# Patient Record
Sex: MALE | Race: BLACK OR AFRICAN AMERICAN | URBAN - METROPOLITAN AREA
[De-identification: names, ages, dates, MRNs, and addresses within clinical notes are randomized per-mention and may not be internally consistent; named-entity substitution may affect disease eponyms.]

---

## 2017-01-16 ENCOUNTER — EMERGENCY (EMERGENCY)
Facility: HOSPITAL | Age: 77
LOS: 1 days | Discharge: HOME | End: 2017-01-16
Attending: EMERGENCY MEDICINE
Payer: MEDICARE

## 2017-01-16 VITALS
OXYGEN SATURATION: 98 % | HEIGHT: 73 IN | TEMPERATURE: 98 F | HEART RATE: 84 BPM | SYSTOLIC BLOOD PRESSURE: 171 MMHG | DIASTOLIC BLOOD PRESSURE: 82 MMHG | WEIGHT: 214.95 LBS | RESPIRATION RATE: 19 BRPM

## 2017-01-16 PROCEDURE — 70450 CT HEAD/BRAIN W/O DYE: CPT | Mod: 26

## 2017-01-16 PROCEDURE — 99284 EMERGENCY DEPT VISIT MOD MDM: CPT

## 2017-01-16 RX ORDER — ACETAMINOPHEN 500 MG
650 TABLET ORAL ONCE
Qty: 0 | Refills: 0 | Status: COMPLETED | OUTPATIENT
Start: 2017-01-16 | End: 2017-01-16

## 2017-01-16 RX ADMIN — Medication 650 MILLIGRAM(S): at 19:55

## 2017-01-16 NOTE — ED PROVIDER NOTE - NS ED MD SCRIBE ATTENDING SCRIBE SECTIONS
HISTORY OF PRESENT ILLNESS/VITAL SIGNS( Pullset)/DISPOSITION/PAST MEDICAL/SURGICAL/SOCIAL HISTORY/REVIEW OF SYSTEMS/PHYSICAL EXAM

## 2017-01-16 NOTE — ED PROVIDER NOTE - OBJECTIVE STATEMENT
A 76 year old male pt presents to the ED s/p fall. The pt is from [facility] and experienced an unwitnessed fall in his room. He denies any LOC and is currently c/o head and hip pain. No further complaints at this time.

## 2017-01-16 NOTE — ED PROVIDER NOTE - CONSTITUTIONAL, MLM
normal... Pt with slurred speech. Pt is unkempt. Awake, alert, oriented to person, place, time/situation.

## 2017-01-16 NOTE — ED ADULT TRIAGE NOTE - CHIEF COMPLAINT QUOTE
patient brought from [facility] home, s/p fall, unwitnessed.  Patient is alert and oriented x3, patient c/o pain in the hip and head. denies LOC, has flat affect and mumble speech, patient is not on any blood thinners. disheveled. breathing is even and unlabored. pupils are 2 reactive

## 2017-01-16 NOTE — ED ADULT NURSE NOTE - CHIEF COMPLAINT QUOTE
patient brought from [facility] home, s/p fall, unwitnessed.  Patient is alert and oriented x3, patient c/o pain in the hip and head. denies LOC, has flat affect and mumble speech, patient is not on any blood thinners. disheveled. breathing is even and unlabored. pupils are 2 reactive

## 2017-01-16 NOTE — ED PROVIDER NOTE - PROGRESS NOTE DETAILS
pt with no complaints.  head ct unremarkable.  no sign of trauma. able to stand.  tolerating po's  dc home

## 2017-01-17 VITALS
DIASTOLIC BLOOD PRESSURE: 78 MMHG | RESPIRATION RATE: 18 BRPM | SYSTOLIC BLOOD PRESSURE: 139 MMHG | TEMPERATURE: 98 F | HEART RATE: 86 BPM | OXYGEN SATURATION: 98 %

## 2017-01-17 PROCEDURE — 70450 CT HEAD/BRAIN W/O DYE: CPT

## 2017-01-17 PROCEDURE — 93010 ELECTROCARDIOGRAM REPORT: CPT

## 2017-01-17 PROCEDURE — 99284 EMERGENCY DEPT VISIT MOD MDM: CPT | Mod: 25

## 2017-01-17 PROCEDURE — 93005 ELECTROCARDIOGRAM TRACING: CPT

## 2017-01-18 ENCOUNTER — EMERGENCY (EMERGENCY)
Facility: HOSPITAL | Age: 77
LOS: 1 days | Discharge: HOME | End: 2017-01-18
Attending: EMERGENCY MEDICINE
Payer: COMMERCIAL

## 2017-01-18 VITALS
SYSTOLIC BLOOD PRESSURE: 109 MMHG | TEMPERATURE: 99 F | DIASTOLIC BLOOD PRESSURE: 63 MMHG | OXYGEN SATURATION: 97 % | HEART RATE: 86 BPM | RESPIRATION RATE: 16 BRPM

## 2017-01-18 VITALS
HEIGHT: 74 IN | HEART RATE: 82 BPM | TEMPERATURE: 99 F | WEIGHT: 220.02 LBS | DIASTOLIC BLOOD PRESSURE: 69 MMHG | OXYGEN SATURATION: 96 % | SYSTOLIC BLOOD PRESSURE: 128 MMHG | RESPIRATION RATE: 16 BRPM

## 2017-01-18 DIAGNOSIS — M54.2 CERVICALGIA: ICD-10-CM

## 2017-01-18 DIAGNOSIS — Z91.012 ALLERGY TO EGGS: ICD-10-CM

## 2017-01-18 DIAGNOSIS — Z91.011 ALLERGY TO MILK PRODUCTS: ICD-10-CM
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
## 2017-01-18 DIAGNOSIS — F25.0 SCHIZOAFFECTIVE DISORDER, BIPOLAR TYPE: ICD-10-CM

## 2017-01-18 PROCEDURE — 72125 CT NECK SPINE W/O DYE: CPT

## 2017-01-18 PROCEDURE — 99284 EMERGENCY DEPT VISIT MOD MDM: CPT | Mod: 25

## 2017-01-18 PROCEDURE — 99284 EMERGENCY DEPT VISIT MOD MDM: CPT

## 2017-01-18 PROCEDURE — 70450 CT HEAD/BRAIN W/O DYE: CPT

## 2017-01-18 PROCEDURE — 70450 CT HEAD/BRAIN W/O DYE: CPT | Mod: 26

## 2017-01-18 PROCEDURE — 72125 CT NECK SPINE W/O DYE: CPT | Mod: 26

## 2017-01-18 NOTE — ED ADULT TRIAGE NOTE - CHIEF COMPLAINT QUOTE
BIBA, residents at [facility] report patient fell, [facility] reports medication non compliance.

## 2017-01-18 NOTE — ED PROVIDER NOTE - MUSCULOSKELETAL, MLM
Spine appears normal, range of motion is not limited, no muscle or joint tenderness + ttp left paraspinal cervical musculature no midline cervical tenderness

## 2017-01-18 NOTE — ED PROVIDER NOTE - OBJECTIVE STATEMENT
pt presents with elft lateral neck pain achy non radiating after fall out of bed on no blood thinenes. no loc no vomiting. denies fever. denies HA  no chest pain or sob. no abd pain. no n/v/d. no urinary f/u/d. no back pain. no motor or sensory deficits. denies drug use. no recent travel. no rash. no other acute issues symptoms or concerns

## 2017-01-18 NOTE — ED PROVIDER NOTE - MEDICAL DECISION MAKING DETAILS
pt pain controlled no motor or senoryd eficits. return to ed for intractable HA neck pain or new onset motor or senory deficits.

## 2017-01-18 NOTE — ED ADULT NURSE NOTE - OBJECTIVE STATEMENT
Resumed care of pt 2230. 75 Y/O male from residence. Pt poor hx. resp even unlabored, no complaints at this time. no obvious injury noted, CT negative. will continue to monitor.

## 2017-01-18 NOTE — ED PROVIDER NOTE - PHYSICAL EXAMINATION
neuro: CN II - XII intact, EOMI, PERRL, no papilledema, 5/5 muscle strength x 4 extremities, no sensory deficits, 2+ dtr globally, negative babinski, no ataxic gait, normal JUAN M and FNT, normal romberg

## 2017-02-20 PROBLEM — F25.0 SCHIZOAFFECTIVE DISORDER, BIPOLAR TYPE: Chronic | Status: ACTIVE | Noted: 2017-01-16

## 2017-03-30 ENCOUNTER — EMERGENCY (EMERGENCY)
Facility: HOSPITAL | Age: 77
LOS: 1 days | Discharge: HOME | End: 2017-03-30
Attending: EMERGENCY MEDICINE
Payer: MEDICARE

## 2017-03-30 VITALS
OXYGEN SATURATION: 96 % | DIASTOLIC BLOOD PRESSURE: 78 MMHG | HEART RATE: 74 BPM | TEMPERATURE: 99 F | RESPIRATION RATE: 18 BRPM | SYSTOLIC BLOOD PRESSURE: 132 MMHG

## 2017-03-30 VITALS
HEIGHT: 72 IN | TEMPERATURE: 99 F | WEIGHT: 169.98 LBS | RESPIRATION RATE: 18 BRPM | DIASTOLIC BLOOD PRESSURE: 69 MMHG | SYSTOLIC BLOOD PRESSURE: 125 MMHG | HEART RATE: 71 BPM | OXYGEN SATURATION: 98 %
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
## 2017-03-30 DIAGNOSIS — M25.521 PAIN IN RIGHT ELBOW: ICD-10-CM

## 2017-03-30 DIAGNOSIS — Z91.012 ALLERGY TO EGGS: ICD-10-CM
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
## 2017-03-30 DIAGNOSIS — Z91.011 ALLERGY TO MILK PRODUCTS: ICD-10-CM

## 2017-03-30 PROCEDURE — 73080 X-RAY EXAM OF ELBOW: CPT | Mod: 26,RT

## 2017-03-30 PROCEDURE — 99283 EMERGENCY DEPT VISIT LOW MDM: CPT | Mod: 25

## 2017-03-30 PROCEDURE — 73080 X-RAY EXAM OF ELBOW: CPT

## 2017-03-30 PROCEDURE — 99283 EMERGENCY DEPT VISIT LOW MDM: CPT

## 2017-03-30 RX ORDER — ACETAMINOPHEN 500 MG
650 TABLET ORAL ONCE
Qty: 0 | Refills: 0 | Status: COMPLETED | OUTPATIENT
Start: 2017-03-30 | End: 2017-03-30

## 2017-03-30 RX ADMIN — Medication 650 MILLIGRAM(S): at 21:52

## 2017-03-30 NOTE — ED PROVIDER NOTE - NEUROLOGICAL, MLM
Alert and oriented, no focal deficits, no motor or sensory deficits. Mumbling. Difficulty to understand.

## 2017-03-30 NOTE — ED PROVIDER NOTE - OBJECTIVE STATEMENT
77 year old male presenting to the ED sent from [facility] for right elbow pain s/p fall out of chair today. As per [facility], pt received an IM injection, which he complained made him sore and caused him to get up from his chair. Pt states that he was getting out of a chair when it slipped back, causing him to fall onto his right side. He denies having any LOC or head trauma. No further complaints at this time.

## 2017-03-30 NOTE — ED PROVIDER NOTE - PROGRESS NOTE DETAILS
X-rays neg.  Pt eating and drinking and using extremities without difficutly.  Pt stable for d/c back to [facility] at [employer],.

## 2017-03-30 NOTE — ED ADULT TRIAGE NOTE - CHIEF COMPLAINT QUOTE
pt alert and awake, normal baseline as per ems, BIBA s/p slip and fall out of chair, denies pain, sent for eval, denies hitting head

## 2017-03-30 NOTE — ED ADULT NURSE NOTE - READING LANGUAGE PREFERRED
PCP addressed anemia today in office and being referred to hematology for further evaluation and treatment. SYBIL Vale   English

## 2017-03-30 NOTE — ED PROVIDER NOTE - NS ED MD SCRIBE ATTENDING SCRIBE SECTIONS
HIV/PHYSICAL EXAM/DISPOSITION/PAST MEDICAL/SURGICAL/SOCIAL HISTORY/VITAL SIGNS( Pullset)/INTAKE ASSESSMENT/SCREENINGS/HISTORY OF PRESENT ILLNESS/REVIEW OF SYSTEMS

## 2017-03-30 NOTE — ED PROVIDER NOTE - MEDICAL DECISION MAKING DETAILS
Will check x-ray of elbow and re-evaluate. Will check x-ray of elbow, treat with Tylenol, and re-evaluate.

## 2017-03-31 NOTE — ED POST DISCHARGE NOTE - RESULT SUMMARY
as per Dr. Machado, elbow joint effusion which may be indicative of occult radial head fx, no sling placed

## 2017-06-13 ENCOUNTER — EMERGENCY (EMERGENCY)
Facility: HOSPITAL | Age: 77
LOS: 1 days | Discharge: HOME | End: 2017-06-13
Attending: EMERGENCY MEDICINE
Payer: MEDICARE

## 2017-06-13 VITALS
TEMPERATURE: 99 F | HEART RATE: 75 BPM | OXYGEN SATURATION: 100 % | DIASTOLIC BLOOD PRESSURE: 61 MMHG | SYSTOLIC BLOOD PRESSURE: 106 MMHG | RESPIRATION RATE: 18 BRPM

## 2017-06-13 VITALS
OXYGEN SATURATION: 100 % | TEMPERATURE: 98 F | RESPIRATION RATE: 18 BRPM | HEART RATE: 97 BPM | SYSTOLIC BLOOD PRESSURE: 131 MMHG | DIASTOLIC BLOOD PRESSURE: 73 MMHG

## 2017-06-13 DIAGNOSIS — F25.8 OTHER SCHIZOAFFECTIVE DISORDERS: ICD-10-CM

## 2017-06-13 LAB
ALBUMIN SERPL ELPH-MCNC: 4 G/DL — SIGNIFICANT CHANGE UP (ref 3.3–5.2)
ALP SERPL-CCNC: 51 U/L — SIGNIFICANT CHANGE UP (ref 40–120)
ALT FLD-CCNC: 6 U/L — SIGNIFICANT CHANGE UP
ANION GAP SERPL CALC-SCNC: 10 MMOL/L — SIGNIFICANT CHANGE UP (ref 5–17)
AST SERPL-CCNC: 13 U/L — SIGNIFICANT CHANGE UP
BILIRUB SERPL-MCNC: 0.3 MG/DL — LOW (ref 0.4–2)
BUN SERPL-MCNC: 16 MG/DL — SIGNIFICANT CHANGE UP (ref 8–20)
CALCIUM SERPL-MCNC: 10.6 MG/DL — HIGH (ref 8.6–10.2)
CHLORIDE SERPL-SCNC: 100 MMOL/L — SIGNIFICANT CHANGE UP (ref 98–107)
CO2 SERPL-SCNC: 30 MMOL/L — HIGH (ref 22–29)
CREAT SERPL-MCNC: 0.82 MG/DL — SIGNIFICANT CHANGE UP (ref 0.5–1.3)
GLUCOSE SERPL-MCNC: 76 MG/DL — SIGNIFICANT CHANGE UP (ref 70–115)
HCT VFR BLD CALC: 36 % — LOW (ref 42–52)
HGB BLD-MCNC: 11.7 G/DL — LOW (ref 14–18)
MCHC RBC-ENTMCNC: 30.9 PG — SIGNIFICANT CHANGE UP (ref 27–31)
MCHC RBC-ENTMCNC: 32.5 G/DL — SIGNIFICANT CHANGE UP (ref 32–36)
MCV RBC AUTO: 95 FL — HIGH (ref 80–94)
PLATELET # BLD AUTO: 173 K/UL — SIGNIFICANT CHANGE UP (ref 150–400)
POTASSIUM SERPL-MCNC: 4.2 MMOL/L — SIGNIFICANT CHANGE UP (ref 3.5–5.3)
POTASSIUM SERPL-SCNC: 4.2 MMOL/L — SIGNIFICANT CHANGE UP (ref 3.5–5.3)
PROT SERPL-MCNC: 7.1 G/DL — SIGNIFICANT CHANGE UP (ref 6.6–8.7)
RBC # BLD: 3.79 M/UL — LOW (ref 4.6–6.2)
RBC # FLD: 14.6 % — SIGNIFICANT CHANGE UP (ref 11–15.6)
SODIUM SERPL-SCNC: 140 MMOL/L — SIGNIFICANT CHANGE UP (ref 135–145)
VALPROATE SERPL-MCNC: 65.7 UG/ML — SIGNIFICANT CHANGE UP (ref 50–100)
WBC # BLD: 4.3 K/UL — LOW (ref 4.8–10.8)
WBC # FLD AUTO: 4.3 K/UL — LOW (ref 4.8–10.8)

## 2017-06-13 PROCEDURE — 99284 EMERGENCY DEPT VISIT MOD MDM: CPT

## 2017-06-13 PROCEDURE — 90792 PSYCH DIAG EVAL W/MED SRVCS: CPT

## 2017-06-13 RX ORDER — SODIUM CHLORIDE 9 MG/ML
3 INJECTION INTRAMUSCULAR; INTRAVENOUS; SUBCUTANEOUS EVERY 8 HOURS
Qty: 0 | Refills: 0 | Status: DISCONTINUED | OUTPATIENT
Start: 2017-06-13 | End: 2017-06-17

## 2017-06-13 RX ADMIN — SODIUM CHLORIDE 3 MILLILITER(S): 9 INJECTION INTRAMUSCULAR; INTRAVENOUS; SUBCUTANEOUS at 23:22

## 2017-06-13 NOTE — ED BEHAVIORAL HEALTH ASSESSMENT NOTE - RISK ASSESSMENT
low - patient not suicidal/homicidal, lives in supervised housing, receives long acting injection and has supportive therapists

## 2017-06-13 NOTE — ED BEHAVIORAL HEALTH ASSESSMENT NOTE - HPI (INCLUDE ILLNESS QUALITY, SEVERITY, DURATION, TIMING, CONTEXT, MODIFYING FACTORS, ASSOCIATED SIGNS AND SYMPTOMS)
76 yo saaf, domiciled with [employer], hx of schizoaffective d/o, last hospitalization [place] november 2016 for aggression, denies substance abuse, BIBA after complaining to staff that he felt weak and paralyzed and requesting to go to hospital.    Patient appears frail and bradykinetic. He denies any hx of mental illness and states that he doesn't need medication because it will "eat my bones up". He believes that staff at [facility] are working with the russians. He is concerned that he has a resting tremor and walks very slowly; he states other residents make fun of him for it. He denies si/hi/avh; no s/s of kendrick. He is worried that he might have meningitis in his spine and is requesting something to eat.     Therapist forest at [facility] reports that mild paranoia, and c/o weakness and paralysis with irritable affect is baseline for this patient. He receives haldol dec 300mg inj (last 2 weeks ago) but misses depakote syrup and ativan at times due to refusal. He has been functioning well at residence and she denies any concern that he he is a risk to himself or other residents/staff. His sleep and appetite are wnl.

## 2017-06-14 LAB
APPEARANCE UR: CLEAR — SIGNIFICANT CHANGE UP
BACTERIA # UR AUTO: ABNORMAL
BILIRUB UR-MCNC: NEGATIVE — SIGNIFICANT CHANGE UP
COLOR SPEC: YELLOW — SIGNIFICANT CHANGE UP
DIFF PNL FLD: NEGATIVE — SIGNIFICANT CHANGE UP
EPI CELLS # UR: NEGATIVE — SIGNIFICANT CHANGE UP
GLUCOSE UR QL: NEGATIVE MG/DL — SIGNIFICANT CHANGE UP
KETONES UR-MCNC: ABNORMAL
LEUKOCYTE ESTERASE UR-ACNC: ABNORMAL
NITRITE UR-MCNC: NEGATIVE — SIGNIFICANT CHANGE UP
PH UR: 8 — SIGNIFICANT CHANGE UP (ref 5–8)
PROT UR-MCNC: NEGATIVE MG/DL — SIGNIFICANT CHANGE UP
RBC CASTS # UR COMP ASSIST: NEGATIVE /HPF — SIGNIFICANT CHANGE UP (ref 0–4)
SP GR SPEC: 1.01 — SIGNIFICANT CHANGE UP (ref 1.01–1.02)
UROBILINOGEN FLD QL: 4 MG/DL
WBC UR QL: SIGNIFICANT CHANGE UP

## 2017-06-14 PROCEDURE — 80164 ASSAY DIPROPYLACETIC ACD TOT: CPT

## 2017-06-14 PROCEDURE — 99284 EMERGENCY DEPT VISIT MOD MDM: CPT

## 2017-06-14 PROCEDURE — 80053 COMPREHEN METABOLIC PANEL: CPT

## 2017-06-14 PROCEDURE — 85027 COMPLETE CBC AUTOMATED: CPT

## 2017-06-14 PROCEDURE — 81001 URINALYSIS AUTO W/SCOPE: CPT

## 2017-06-14 NOTE — ED PROVIDER NOTE - OBJECTIVE STATEMENT
78 yo male presents to er secondary to reported bizarre behavior, pt reported to have be having hallucinations and think staff are [place] spies  denies other complaints

## 2017-08-27 ENCOUNTER — EMERGENCY (EMERGENCY)
Facility: HOSPITAL | Age: 77
LOS: 1 days | Discharge: HOME | End: 2017-08-27
Attending: STUDENT IN AN ORGANIZED HEALTH CARE EDUCATION/TRAINING PROGRAM
Payer: MEDICARE

## 2017-08-27 VITALS
DIASTOLIC BLOOD PRESSURE: 82 MMHG | HEART RATE: 95 BPM | OXYGEN SATURATION: 98 % | SYSTOLIC BLOOD PRESSURE: 123 MMHG | TEMPERATURE: 98 F | HEIGHT: 72 IN | WEIGHT: 175.05 LBS | RESPIRATION RATE: 18 BRPM

## 2017-08-27 LAB
ALBUMIN SERPL ELPH-MCNC: 4.1 G/DL — SIGNIFICANT CHANGE UP (ref 3.3–5.2)
ALP SERPL-CCNC: 56 U/L — SIGNIFICANT CHANGE UP (ref 40–120)
ALT FLD-CCNC: 11 U/L — SIGNIFICANT CHANGE UP
ANION GAP SERPL CALC-SCNC: 15 MMOL/L — SIGNIFICANT CHANGE UP (ref 5–17)
AST SERPL-CCNC: 37 U/L — SIGNIFICANT CHANGE UP
BILIRUB SERPL-MCNC: 0.7 MG/DL — SIGNIFICANT CHANGE UP (ref 0.4–2)
BUN SERPL-MCNC: 17 MG/DL — SIGNIFICANT CHANGE UP (ref 8–20)
CALCIUM SERPL-MCNC: 11.1 MG/DL — HIGH (ref 8.6–10.2)
CHLORIDE SERPL-SCNC: 99 MMOL/L — SIGNIFICANT CHANGE UP (ref 98–107)
CO2 SERPL-SCNC: 24 MMOL/L — SIGNIFICANT CHANGE UP (ref 22–29)
CREAT SERPL-MCNC: 0.67 MG/DL — SIGNIFICANT CHANGE UP (ref 0.5–1.3)
EOSINOPHIL NFR BLD AUTO: 1 % — SIGNIFICANT CHANGE UP (ref 0–6)
GLUCOSE SERPL-MCNC: 72 MG/DL — SIGNIFICANT CHANGE UP (ref 70–115)
HCT VFR BLD CALC: 38.9 % — LOW (ref 42–52)
HGB BLD-MCNC: 13.2 G/DL — LOW (ref 14–18)
LYMPHOCYTES # BLD AUTO: 24 % — SIGNIFICANT CHANGE UP (ref 20–55)
MCHC RBC-ENTMCNC: 31.4 PG — HIGH (ref 27–31)
MCHC RBC-ENTMCNC: 33.9 G/DL — SIGNIFICANT CHANGE UP (ref 32–36)
MCV RBC AUTO: 92.4 FL — SIGNIFICANT CHANGE UP (ref 80–94)
MONOCYTES NFR BLD AUTO: 14 % — HIGH (ref 3–10)
NEUTROPHILS NFR BLD AUTO: 61 % — SIGNIFICANT CHANGE UP (ref 37–73)
PLAT MORPH BLD: NORMAL — SIGNIFICANT CHANGE UP
PLATELET # BLD AUTO: 166 K/UL — SIGNIFICANT CHANGE UP (ref 150–400)
POTASSIUM SERPL-MCNC: 4 MMOL/L — SIGNIFICANT CHANGE UP (ref 3.5–5.3)
POTASSIUM SERPL-SCNC: 4 MMOL/L — SIGNIFICANT CHANGE UP (ref 3.5–5.3)
PROT SERPL-MCNC: 7.6 G/DL — SIGNIFICANT CHANGE UP (ref 6.6–8.7)
RBC # BLD: 4.21 M/UL — LOW (ref 4.6–6.2)
RBC # FLD: 13.8 % — SIGNIFICANT CHANGE UP (ref 11–15.6)
RBC BLD AUTO: NORMAL — SIGNIFICANT CHANGE UP
SODIUM SERPL-SCNC: 138 MMOL/L — SIGNIFICANT CHANGE UP (ref 135–145)
WBC # BLD: 8.4 K/UL — SIGNIFICANT CHANGE UP (ref 4.8–10.8)
WBC # FLD AUTO: 8.4 K/UL — SIGNIFICANT CHANGE UP (ref 4.8–10.8)

## 2017-08-27 PROCEDURE — 80053 COMPREHEN METABOLIC PANEL: CPT

## 2017-08-27 PROCEDURE — 99284 EMERGENCY DEPT VISIT MOD MDM: CPT | Mod: 25

## 2017-08-27 PROCEDURE — 70450 CT HEAD/BRAIN W/O DYE: CPT

## 2017-08-27 PROCEDURE — 36415 COLL VENOUS BLD VENIPUNCTURE: CPT

## 2017-08-27 PROCEDURE — 99284 EMERGENCY DEPT VISIT MOD MDM: CPT

## 2017-08-27 PROCEDURE — 85027 COMPLETE CBC AUTOMATED: CPT

## 2017-08-27 PROCEDURE — 70450 CT HEAD/BRAIN W/O DYE: CPT | Mod: 26

## 2017-08-27 PROCEDURE — 73030 X-RAY EXAM OF SHOULDER: CPT

## 2017-08-27 NOTE — ED PROVIDER NOTE - MUSCULOSKELETAL MINIMAL EXAM
normal range of motion/motor intact/TENDERNESS/neck supple normal range of motion/no chest wall tenderness/neck supple/motor intact/TENDERNESS

## 2017-08-27 NOTE — ED ADULT NURSE NOTE - OBJECTIVE STATEMENT
pt poor historian, pt has slow slurred speech, disoriented to time, states he fell forward getting out of bed and hit his head on the floor, no noticable external injuries, does not recall meds

## 2017-08-27 NOTE — ED PROVIDER NOTE - OBJECTIVE STATEMENT
76 y/o M pt with a PMHx of Schizophrenia BIBA to the ED c/o weakness, R arm pain, L shoulder pain, headache, hip pain, back pain s/p fall today. Describes the pain as "needles." He explains that he fell because he was very tired. Reports the weakness over the past couple of years.  Denies Hx of Parkinson's.  Dr. Pressley:PMD

## 2017-08-27 NOTE — ED ADULT NURSE REASSESSMENT NOTE - NS ED NURSE REASSESS COMMENT FT1
pt care assumed at 1930, no apparent distress noted at this time, charting as noted. Pt received Alert and Oriented to person, place, and time sitting in bed comfortably. pt is requesting food at this time, pt  advised that he needs to received testing before he can eat. plan of care explained, will continue to monitor.

## 2017-08-27 NOTE — ED ADULT NURSE NOTE - CHIEF COMPLAINT QUOTE
Patient BIBA, sent from [place], as per EMS staff stated patient tripped and fell, patient denies any LOC, mild swelling to left eye brow. Patient offering no other complaints at this time.

## 2017-08-27 NOTE — ED ADULT TRIAGE NOTE - CHIEF COMPLAINT QUOTE
Patient BIBA, sent from [place], as per EMS staff stated patient tripped and fell, patient denies any LOC, mild swelling to left eye brow. Patient offering no other complaints at this time.

## 2017-08-28 VITALS
DIASTOLIC BLOOD PRESSURE: 80 MMHG | RESPIRATION RATE: 20 BRPM | SYSTOLIC BLOOD PRESSURE: 126 MMHG | TEMPERATURE: 98 F | HEART RATE: 100 BPM | OXYGEN SATURATION: 99 %

## 2017-08-28 PROCEDURE — 73030 X-RAY EXAM OF SHOULDER: CPT | Mod: 26,LT

## 2017-10-26 ENCOUNTER — EMERGENCY (EMERGENCY)
Facility: HOSPITAL | Age: 77
LOS: 1 days | Discharge: HOME | End: 2017-10-26
Attending: EMERGENCY MEDICINE | Admitting: EMERGENCY MEDICINE
Payer: MEDICARE

## 2017-10-26 VITALS
WEIGHT: 179.9 LBS | DIASTOLIC BLOOD PRESSURE: 74 MMHG | SYSTOLIC BLOOD PRESSURE: 127 MMHG | OXYGEN SATURATION: 97 % | HEIGHT: 73 IN | HEART RATE: 78 BPM | RESPIRATION RATE: 20 BRPM | TEMPERATURE: 98 F

## 2017-10-26 LAB
ALBUMIN SERPL ELPH-MCNC: 4.2 G/DL — SIGNIFICANT CHANGE UP (ref 3.3–5.2)
ALP SERPL-CCNC: 51 U/L — SIGNIFICANT CHANGE UP (ref 40–120)
ALT FLD-CCNC: <5 U/L — SIGNIFICANT CHANGE UP
ANION GAP SERPL CALC-SCNC: 8 MMOL/L — SIGNIFICANT CHANGE UP (ref 5–17)
APTT BLD: 35.8 SEC — SIGNIFICANT CHANGE UP (ref 27.5–37.4)
AST SERPL-CCNC: 12 U/L — SIGNIFICANT CHANGE UP
BILIRUB SERPL-MCNC: 0.2 MG/DL — LOW (ref 0.4–2)
BUN SERPL-MCNC: 21 MG/DL — HIGH (ref 8–20)
CALCIUM SERPL-MCNC: 11.8 MG/DL — HIGH (ref 8.6–10.2)
CHLORIDE SERPL-SCNC: 104 MMOL/L — SIGNIFICANT CHANGE UP (ref 98–107)
CK SERPL-CCNC: 61 U/L — SIGNIFICANT CHANGE UP (ref 30–200)
CO2 SERPL-SCNC: 31 MMOL/L — HIGH (ref 22–29)
CREAT SERPL-MCNC: 0.79 MG/DL — SIGNIFICANT CHANGE UP (ref 0.5–1.3)
GLUCOSE SERPL-MCNC: 100 MG/DL — SIGNIFICANT CHANGE UP (ref 70–115)
HCT VFR BLD CALC: 38 % — LOW (ref 42–52)
HGB BLD-MCNC: 12.4 G/DL — LOW (ref 14–18)
INR BLD: 1.08 RATIO — SIGNIFICANT CHANGE UP (ref 0.88–1.16)
MCHC RBC-ENTMCNC: 31 PG — SIGNIFICANT CHANGE UP (ref 27–31)
MCHC RBC-ENTMCNC: 32.6 G/DL — SIGNIFICANT CHANGE UP (ref 32–36)
MCV RBC AUTO: 95 FL — HIGH (ref 80–94)
PLATELET # BLD AUTO: 138 K/UL — LOW (ref 150–400)
POTASSIUM SERPL-MCNC: 4.1 MMOL/L — SIGNIFICANT CHANGE UP (ref 3.5–5.3)
POTASSIUM SERPL-SCNC: 4.1 MMOL/L — SIGNIFICANT CHANGE UP (ref 3.5–5.3)
PROT SERPL-MCNC: 7.3 G/DL — SIGNIFICANT CHANGE UP (ref 6.6–8.7)
PROTHROM AB SERPL-ACNC: 11.9 SEC — SIGNIFICANT CHANGE UP (ref 9.8–12.7)
RBC # BLD: 4 M/UL — LOW (ref 4.6–6.2)
RBC # FLD: 14.3 % — SIGNIFICANT CHANGE UP (ref 11–15.6)
SODIUM SERPL-SCNC: 143 MMOL/L — SIGNIFICANT CHANGE UP (ref 135–145)
TROPONIN T SERPL-MCNC: <0.01 NG/ML — SIGNIFICANT CHANGE UP (ref 0–0.06)
WBC # BLD: 4.3 K/UL — LOW (ref 4.8–10.8)
WBC # FLD AUTO: 4.3 K/UL — LOW (ref 4.8–10.8)

## 2017-10-26 PROCEDURE — 93005 ELECTROCARDIOGRAM TRACING: CPT

## 2017-10-26 PROCEDURE — 83735 ASSAY OF MAGNESIUM: CPT

## 2017-10-26 PROCEDURE — 82550 ASSAY OF CK (CPK): CPT

## 2017-10-26 PROCEDURE — 85027 COMPLETE CBC AUTOMATED: CPT

## 2017-10-26 PROCEDURE — 36415 COLL VENOUS BLD VENIPUNCTURE: CPT

## 2017-10-26 PROCEDURE — 70450 CT HEAD/BRAIN W/O DYE: CPT

## 2017-10-26 PROCEDURE — 85610 PROTHROMBIN TIME: CPT

## 2017-10-26 PROCEDURE — 85730 THROMBOPLASTIN TIME PARTIAL: CPT

## 2017-10-26 PROCEDURE — 99284 EMERGENCY DEPT VISIT MOD MDM: CPT | Mod: 25

## 2017-10-26 PROCEDURE — 84484 ASSAY OF TROPONIN QUANT: CPT

## 2017-10-26 PROCEDURE — 70450 CT HEAD/BRAIN W/O DYE: CPT | Mod: 26

## 2017-10-26 PROCEDURE — 80053 COMPREHEN METABOLIC PANEL: CPT

## 2017-10-26 PROCEDURE — 93010 ELECTROCARDIOGRAM REPORT: CPT

## 2017-10-26 PROCEDURE — 99284 EMERGENCY DEPT VISIT MOD MDM: CPT

## 2017-10-26 NOTE — ED PROVIDER NOTE - OBJECTIVE STATEMENT
78 y/o M pt presents to ED 78 y/o M pt presents to ED BIBA from [facility] c/o dizziness for over 1 year. Pt states "he got kicked in the balls, causing him to lose balance". Hx limited secondary to pt being poor historian.

## 2017-10-26 NOTE — ED ADULT NURSE NOTE - CHIEF COMPLAINT QUOTE
dizziness and feeling off balance for a   "long time".  arrives for [facility]

## 2017-10-26 NOTE — ED ADULT NURSE NOTE - OBJECTIVE STATEMENT
pt awake and alert, oriented to person and occasional time. pt reports dizziness for "a couple months." pt denies any chest pain or sob. pt denies headache, n/v. pt states he lives in "group home on [address] unable to provide any further information. per EMS, pt at baseline mental status. pt awaiting results and CT. will continue to monitor.

## 2017-10-27 NOTE — ED ADULT NURSE REASSESSMENT NOTE - NS ED NURSE REASSESS COMMENT FT1
pt lying comfortably in bed, in no apparent distress. pt to be discharged, awaiting EAS. will continue to monitor.

## 2018-08-03 ENCOUNTER — EMERGENCY (EMERGENCY)
Facility: HOSPITAL | Age: 78
LOS: 1 days | Discharge: OTHER INSTITUTION | End: 2018-08-03
Attending: EMERGENCY MEDICINE
Payer: MEDICARE

## 2018-08-03 VITALS
SYSTOLIC BLOOD PRESSURE: 164 MMHG | HEART RATE: 106 BPM | OXYGEN SATURATION: 98 % | HEIGHT: 70 IN | DIASTOLIC BLOOD PRESSURE: 100 MMHG | TEMPERATURE: 98 F | WEIGHT: 179.9 LBS | RESPIRATION RATE: 20 BRPM

## 2018-08-03 LAB
ALBUMIN SERPL ELPH-MCNC: 4.2 G/DL — SIGNIFICANT CHANGE UP (ref 3.3–5.2)
ALP SERPL-CCNC: 45 U/L — SIGNIFICANT CHANGE UP (ref 40–120)
ALT FLD-CCNC: 7 U/L — SIGNIFICANT CHANGE UP
ANION GAP SERPL CALC-SCNC: 11 MMOL/L — SIGNIFICANT CHANGE UP (ref 5–17)
AST SERPL-CCNC: 14 U/L — SIGNIFICANT CHANGE UP
BASOPHILS # BLD AUTO: 0 K/UL — SIGNIFICANT CHANGE UP (ref 0–0.2)
BASOPHILS NFR BLD AUTO: 0.2 % — SIGNIFICANT CHANGE UP (ref 0–2)
BILIRUB SERPL-MCNC: 0.5 MG/DL — SIGNIFICANT CHANGE UP (ref 0.4–2)
BUN SERPL-MCNC: 12 MG/DL — SIGNIFICANT CHANGE UP (ref 8–20)
CALCIUM SERPL-MCNC: 11.9 MG/DL — HIGH (ref 8.6–10.2)
CHLORIDE SERPL-SCNC: 105 MMOL/L — SIGNIFICANT CHANGE UP (ref 98–107)
CO2 SERPL-SCNC: 24 MMOL/L — SIGNIFICANT CHANGE UP (ref 22–29)
CREAT SERPL-MCNC: 0.67 MG/DL — SIGNIFICANT CHANGE UP (ref 0.5–1.3)
EOSINOPHIL # BLD AUTO: 0 K/UL — SIGNIFICANT CHANGE UP (ref 0–0.5)
EOSINOPHIL NFR BLD AUTO: 0.4 % — SIGNIFICANT CHANGE UP (ref 0–5)
ETHANOL SERPL-MCNC: <10 MG/DL — SIGNIFICANT CHANGE UP
GLUCOSE SERPL-MCNC: 110 MG/DL — SIGNIFICANT CHANGE UP (ref 70–115)
HCT VFR BLD CALC: 36.5 % — LOW (ref 42–52)
HGB BLD-MCNC: 11.9 G/DL — LOW (ref 14–18)
LYMPHOCYTES # BLD AUTO: 1.2 K/UL — SIGNIFICANT CHANGE UP (ref 1–4.8)
LYMPHOCYTES # BLD AUTO: 27.8 % — SIGNIFICANT CHANGE UP (ref 20–55)
MCHC RBC-ENTMCNC: 30.1 PG — SIGNIFICANT CHANGE UP (ref 27–31)
MCHC RBC-ENTMCNC: 32.6 G/DL — SIGNIFICANT CHANGE UP (ref 32–36)
MCV RBC AUTO: 92.4 FL — SIGNIFICANT CHANGE UP (ref 80–94)
MONOCYTES # BLD AUTO: 0.5 K/UL — SIGNIFICANT CHANGE UP (ref 0–0.8)
MONOCYTES NFR BLD AUTO: 10.5 % — HIGH (ref 3–10)
NEUTROPHILS # BLD AUTO: 2.7 K/UL — SIGNIFICANT CHANGE UP (ref 1.8–8)
NEUTROPHILS NFR BLD AUTO: 61.1 % — SIGNIFICANT CHANGE UP (ref 37–73)
PLATELET # BLD AUTO: 160 K/UL — SIGNIFICANT CHANGE UP (ref 150–400)
POTASSIUM SERPL-MCNC: 3.8 MMOL/L — SIGNIFICANT CHANGE UP (ref 3.5–5.3)
POTASSIUM SERPL-SCNC: 3.8 MMOL/L — SIGNIFICANT CHANGE UP (ref 3.5–5.3)
PROT SERPL-MCNC: 7 G/DL — SIGNIFICANT CHANGE UP (ref 6.6–8.7)
RBC # BLD: 3.95 M/UL — LOW (ref 4.6–6.2)
RBC # FLD: 13.4 % — SIGNIFICANT CHANGE UP (ref 11–15.6)
SODIUM SERPL-SCNC: 140 MMOL/L — SIGNIFICANT CHANGE UP (ref 135–145)
WBC # BLD: 4.5 K/UL — LOW (ref 4.8–10.8)
WBC # FLD AUTO: 4.5 K/UL — LOW (ref 4.8–10.8)

## 2018-08-03 PROCEDURE — 99285 EMERGENCY DEPT VISIT HI MDM: CPT

## 2018-08-03 PROCEDURE — 93010 ELECTROCARDIOGRAM REPORT: CPT

## 2018-08-03 PROCEDURE — 71045 X-RAY EXAM CHEST 1 VIEW: CPT | Mod: 26

## 2018-08-03 PROCEDURE — 70450 CT HEAD/BRAIN W/O DYE: CPT | Mod: 26

## 2018-08-03 RX ORDER — DIPHENHYDRAMINE HCL 50 MG
50 CAPSULE ORAL ONCE
Qty: 0 | Refills: 0 | Status: COMPLETED | OUTPATIENT
Start: 2018-08-03 | End: 2018-08-03

## 2018-08-03 RX ORDER — HALOPERIDOL DECANOATE 100 MG/ML
5 INJECTION INTRAMUSCULAR ONCE
Qty: 0 | Refills: 0 | Status: COMPLETED | OUTPATIENT
Start: 2018-08-03 | End: 2018-08-03

## 2018-08-03 RX ADMIN — HALOPERIDOL DECANOATE 5 MILLIGRAM(S): 100 INJECTION INTRAMUSCULAR at 20:40

## 2018-08-03 RX ADMIN — Medication 50 MILLIGRAM(S): at 20:40

## 2018-08-03 RX ADMIN — Medication 2 MILLIGRAM(S): at 20:50

## 2018-08-03 NOTE — ED ADULT TRIAGE NOTE - CHIEF COMPLAINT QUOTE
Pt BIBA from [facility] for AMS and combative behavior, per EMS pt able to follow commands. Pt arrives in soft wrist restraints, shouting loudly at staff. Security called to bedside. Pt changed into yellow gown and belongings taken to  by [facility]

## 2018-08-03 NOTE — ED ADULT NURSE NOTE - CHIEF COMPLAINT QUOTE
Pt BIBA from [facility] for AMS and combative behavior, per EMS pt able to follow commands. Pt arrives in soft wrist restraints, shouting loudly at staff. Security called to bedside. Pt changed into yellow gown and belongings taken to  by [facility]

## 2018-08-03 NOTE — ED PROVIDER NOTE - MEDICAL DECISION MAKING DETAILS
WDL 77 y/o M pt with psych hx presents AMS, non redirect able, most likely due to psych hx. Will check electrolytes, and file [id number] for staff and pt's protection. Pt will need ot be admitted to  after medical clearance. 79 y/o M pt with psych hx presents AMS, non redirect able, most likely due to psych hx. Will check electrolytes, and file [id number] for staff and pt's protection, will need sedation. Pt will need ot be admitted to  after medical clearance. 77 y/o M pt with psych hx presents AMS, nonredirectable, most likely due to psych hx. Will check electrolytes, and give sedation  for staff and pt's protection, will need sedation. will need psych eval sp medical clearance

## 2018-08-03 NOTE — ED PROVIDER NOTE - PROGRESS NOTE DETAILS
pt in  and awaiting a bed no bed yet Mindy: I saw, examined and interviewed patient who was found to be in no acute distress, laying flat in bed. Pt with poor insight into medical conditions.  Per preexisting paperwork from Group Home which accompanied patient to the ER, he had known hyperparathyroidism and is on treatment for hypercalcemia including Alendronate and lasix.  Pt denies any cardiac history, chest pain, or shortness of breath. Heart sounds normal and lungs CTA b/l.  Patient is medically cleared for psychiatric hospitalization and has no acute medical condition that would prohibit his psychiatric care. Mindy: I saw, examined and interviewed patient who was found to be in no acute distress, laying flat in bed. Pt with poor insight into medical conditions.  Per preexisting paperwork from Group Home which accompanied patient to the ER, he had known hyperparathyroidism and is on treatment for hypercalcemia including Alendronate and lasix.  Pt denies any cardiac history, chest pain, or shortness of breath. Heart sounds normal and lungs CTA b/l.  Patient is medically cleared for psychiatric hospitalization and has no acute medical condition that would require acute medical intervention or medical hospitalization that would prohibit or delay his psychiatric care. As per sign-out from Dr. Guillen, patient is medically cleared ( elevated calcium level is stable and chronic which patient is undergoing chronic treatment for) is awaiting inpatient psychiatry placement.

## 2018-08-03 NOTE — ED ADULT NURSE NOTE - INTERVENTIONS DEFINITIONS
Stretcher in lowest position, wheels locked, appropriate side rails in place/Provide visual cue, wrist band, yellow gown, etc. Stretcher in lowest position, wheels locked, appropriate side rails in place/Provide visual cue, wrist band, yellow gown, etc./Monitor gait and stability

## 2018-08-03 NOTE — ED PROVIDER NOTE - OBJECTIVE STATEMENT
77 y/o M pt with psych hx presents to ED BIBA sent from [facility] for AMS. Denies f/c/n/v/cp/sob/palpitations/ cough/rash/headache/dizziness/abd.pain/d/c/dysuria/hematuria. No further complaints at this time. 77 y/o M pt with psych hx schizoaffective presents to ED GABY sent from [facility] for AMS. Denies any symptoms but is not cooperative. No further complaints at this time.

## 2018-08-03 NOTE — ED ADULT NURSE NOTE - OBJECTIVE STATEMENT
Assumed patient care at 2100.  Pt in yellow gown sleeping with even, unlabored respirations.  pt received IV medication for combative and unsafe behavior prior to this RN's assessment.  Side rails up.  1:1 aide at bedside.  Cardiac monitor showing normal sinus rhythm with stable blood pressure and oxygen saturation of 99% on 2l o2 via NC.  As per triage RN, pt was sent from community residence on [facility] for increasing combative behavior and disorientation (flight of ideas) over the past two weeks.  Pt is arousable to light tactile stimulation but will not answer any questions.

## 2018-08-03 NOTE — ED PROVIDER NOTE - PRINCIPAL DIAGNOSIS
Altered mental status, unspecified altered mental status type Schizoaffective disorder, unspecified type

## 2018-08-03 NOTE — ED PROVIDER NOTE - CARE PLAN
Principal Discharge DX:	Altered mental status, unspecified altered mental status type Principal Discharge DX:	Schizoaffective disorder, unspecified type

## 2018-08-04 DIAGNOSIS — F25.8 OTHER SCHIZOAFFECTIVE DISORDERS: ICD-10-CM

## 2018-08-04 LAB
AMPHET UR-MCNC: NEGATIVE — SIGNIFICANT CHANGE UP
ANION GAP SERPL CALC-SCNC: 9 MMOL/L — SIGNIFICANT CHANGE UP (ref 5–17)
APPEARANCE UR: CLEAR — SIGNIFICANT CHANGE UP
BARBITURATES UR SCN-MCNC: NEGATIVE — SIGNIFICANT CHANGE UP
BENZODIAZ UR-MCNC: NEGATIVE — SIGNIFICANT CHANGE UP
BILIRUB UR-MCNC: NEGATIVE — SIGNIFICANT CHANGE UP
BUN SERPL-MCNC: 15 MG/DL — SIGNIFICANT CHANGE UP (ref 8–20)
CALCIUM SERPL-MCNC: 11.7 MG/DL — HIGH (ref 8.6–10.2)
CHLORIDE SERPL-SCNC: 105 MMOL/L — SIGNIFICANT CHANGE UP (ref 98–107)
CO2 SERPL-SCNC: 27 MMOL/L — SIGNIFICANT CHANGE UP (ref 22–29)
COCAINE METAB.OTHER UR-MCNC: NEGATIVE — SIGNIFICANT CHANGE UP
COLOR SPEC: YELLOW — SIGNIFICANT CHANGE UP
CREAT SERPL-MCNC: 0.85 MG/DL — SIGNIFICANT CHANGE UP (ref 0.5–1.3)
DIFF PNL FLD: ABNORMAL
EPI CELLS # UR: SIGNIFICANT CHANGE UP
GLUCOSE SERPL-MCNC: 92 MG/DL — SIGNIFICANT CHANGE UP (ref 70–115)
GLUCOSE UR QL: NEGATIVE MG/DL — SIGNIFICANT CHANGE UP
KETONES UR-MCNC: NEGATIVE — SIGNIFICANT CHANGE UP
LEUKOCYTE ESTERASE UR-ACNC: ABNORMAL
METHADONE UR-MCNC: NEGATIVE — SIGNIFICANT CHANGE UP
NITRITE UR-MCNC: NEGATIVE — SIGNIFICANT CHANGE UP
OPIATES UR-MCNC: NEGATIVE — SIGNIFICANT CHANGE UP
PCP SPEC-MCNC: SIGNIFICANT CHANGE UP
PCP UR-MCNC: NEGATIVE — SIGNIFICANT CHANGE UP
PH UR: 6 — SIGNIFICANT CHANGE UP (ref 5–8)
POTASSIUM SERPL-MCNC: 4.7 MMOL/L — SIGNIFICANT CHANGE UP (ref 3.5–5.3)
POTASSIUM SERPL-SCNC: 4.7 MMOL/L — SIGNIFICANT CHANGE UP (ref 3.5–5.3)
PROT UR-MCNC: 15 MG/DL
RBC CASTS # UR COMP ASSIST: SIGNIFICANT CHANGE UP /HPF (ref 0–4)
SODIUM SERPL-SCNC: 141 MMOL/L — SIGNIFICANT CHANGE UP (ref 135–145)
SP GR SPEC: 1.02 — SIGNIFICANT CHANGE UP (ref 1.01–1.02)
THC UR QL: NEGATIVE — SIGNIFICANT CHANGE UP
UROBILINOGEN FLD QL: NEGATIVE MG/DL — SIGNIFICANT CHANGE UP
WBC UR QL: SIGNIFICANT CHANGE UP

## 2018-08-04 PROCEDURE — 99285 EMERGENCY DEPT VISIT HI MDM: CPT

## 2018-08-04 RX ORDER — VALPROIC ACID (AS SODIUM SALT) 250 MG/5ML
750 SOLUTION, ORAL ORAL
Qty: 0 | Refills: 0 | Status: DISCONTINUED | OUTPATIENT
Start: 2018-08-04 | End: 2018-08-08

## 2018-08-04 RX ORDER — SODIUM CHLORIDE 9 MG/ML
1000 INJECTION INTRAMUSCULAR; INTRAVENOUS; SUBCUTANEOUS ONCE
Qty: 0 | Refills: 0 | Status: COMPLETED | OUTPATIENT
Start: 2018-08-04 | End: 2018-08-04

## 2018-08-04 RX ORDER — HALOPERIDOL DECANOATE 100 MG/ML
5 INJECTION INTRAMUSCULAR DAILY
Qty: 0 | Refills: 0 | Status: DISCONTINUED | OUTPATIENT
Start: 2018-08-04 | End: 2018-08-08

## 2018-08-04 RX ORDER — FUROSEMIDE 40 MG
20 TABLET ORAL DAILY
Qty: 0 | Refills: 0 | Status: DISCONTINUED | OUTPATIENT
Start: 2018-08-04 | End: 2018-08-08

## 2018-08-04 RX ORDER — FUROSEMIDE 40 MG
20 TABLET ORAL ONCE
Qty: 0 | Refills: 0 | Status: COMPLETED | OUTPATIENT
Start: 2018-08-04 | End: 2018-08-04

## 2018-08-04 RX ORDER — PROPRANOLOL HCL 160 MG
10 CAPSULE, EXTENDED RELEASE 24HR ORAL DAILY
Qty: 0 | Refills: 0 | Status: DISCONTINUED | OUTPATIENT
Start: 2018-08-04 | End: 2018-08-08

## 2018-08-04 RX ADMIN — Medication 750 MILLIGRAM(S): at 11:25

## 2018-08-04 RX ADMIN — HALOPERIDOL DECANOATE 5 MILLIGRAM(S): 100 INJECTION INTRAMUSCULAR at 11:23

## 2018-08-04 RX ADMIN — Medication 20 MILLIGRAM(S): at 11:23

## 2018-08-04 RX ADMIN — Medication 10 MILLIGRAM(S): at 11:23

## 2018-08-04 RX ADMIN — Medication 750 MILLIGRAM(S): at 19:04

## 2018-08-04 RX ADMIN — Medication 0.5 MILLIGRAM(S): at 11:23

## 2018-08-04 RX ADMIN — Medication 0.5 MILLIGRAM(S): at 15:22

## 2018-08-04 RX ADMIN — Medication 0.5 MILLIGRAM(S): at 22:01

## 2018-08-04 NOTE — ED BEHAVIORAL HEALTH ASSESSMENT NOTE - CURRENT MEDICATION
furosemide    Tablet 20 milliGRAM(s) Oral daily  haloperidol     Tablet 5 cuco GRAM(s) Oral daily, valproic  acid Syrup 750 mg Oral two times daily Lorazepam     Tablet 0.5 cuco GRAM(s) Oral three times a day  propranolol 10 cuco GRAM(s) Oral daily

## 2018-08-04 NOTE — ED BEHAVIORAL HEALTH ASSESSMENT NOTE - DESCRIPTION
combative upon arrival belligerent required sedation with IM haldol Ativan and benadryl in main ED before transfer to psych unit  Vital Signs Last 24 Hrs  T(C): 37.1 (04 Aug 2018 08:16), Max: 37.1 (04 Aug 2018 08:16)  T(F): 98.8 (04 Aug 2018 08:16), Max: 98.8 (04 Aug 2018 08:16)  HR: 66 (04 Aug 2018 08:16) (66 - 106)  BP: 120/69 (04 Aug 2018 08:16) (120/69 - 164/100)  RR: 18 (04 Aug 2018 08:16) (18 - 20)  SpO2: 100% (04 Aug 2018 08:16) (98% - 100%)  < from: CT Head No Cont (08.03.18 @ 22:41) >    IMPRESSION:  No hemorrhage or mass effect.     < end of copied text > HTN disabled

## 2018-08-04 NOTE — ED BEHAVIORAL HEALTH NOTE - BEHAVIORAL HEALTH NOTE
SHELLYNote: worker called [facility] to inform him about pt's new calcium level (11.7) as per Dr Houston hospitalist recommended IV fluids, pt to stay overnight and will be reviewed with new labs in the am when hospitalist can decide the appropriate level  to accept pt/unable to take pt as yet.   Worker called Dr Yousif informed about the above, pt will have IV fluids and labs will be repeated in the am. SW to follow.

## 2018-08-04 NOTE — ED BEHAVIORAL HEALTH ASSESSMENT NOTE - HPI (INCLUDE ILLNESS QUALITY, SEVERITY, DURATION, TIMING, CONTEXT, MODIFYING FACTORS, ASSOCIATED SIGNS AND SYMPTOMS)
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

## 2018-08-04 NOTE — ED BEHAVIORAL HEALTH NOTE - BEHAVIORAL HEALTH NOTE
SW Note: Worker spoke to Resident, Dr. Tarango, from [facility]. Resident stated pt is accepted as long as the pt's calcium is regulated before pt goes. Dr. Vargas made aware of Residents request and will follow up accordingly.

## 2018-08-04 NOTE — ED BEHAVIORAL HEALTH NOTE - BEHAVIORAL HEALTH NOTE
SW Note:  made aware that there is a geriatric bed available for pt. Clinicals sent to [facility] for review. Will continue to follow.

## 2018-08-04 NOTE — ED BEHAVIORAL HEALTH ASSESSMENT NOTE - SUMMARY
78 yr old male with schizoaffective disorder now manic with grandiose delusions and aggressive behaviors

## 2018-08-04 NOTE — ED BEHAVIORAL HEALTH NOTE - BEHAVIORAL HEALTH NOTE
Social Work Note: as per psych MD patient would benefit from inpatient psychiatric admission at this time. Cliniclas were sent to [facility] for review and they do not have an appropriate bed at this time for patient. sw will continue to follow for bed availability.

## 2018-08-05 LAB
ALBUMIN SERPL ELPH-MCNC: 4.4 G/DL — SIGNIFICANT CHANGE UP (ref 3.3–5.2)
ALP SERPL-CCNC: 52 U/L — SIGNIFICANT CHANGE UP (ref 40–120)
ALT FLD-CCNC: 8 U/L — SIGNIFICANT CHANGE UP
ANION GAP SERPL CALC-SCNC: 11 MMOL/L — SIGNIFICANT CHANGE UP (ref 5–17)
AST SERPL-CCNC: 16 U/L — SIGNIFICANT CHANGE UP
BILIRUB SERPL-MCNC: 0.4 MG/DL — SIGNIFICANT CHANGE UP (ref 0.4–2)
BUN SERPL-MCNC: 15 MG/DL — SIGNIFICANT CHANGE UP (ref 8–20)
CALCIUM SERPL-MCNC: 11.6 MG/DL — HIGH (ref 8.6–10.2)
CHLORIDE SERPL-SCNC: 104 MMOL/L — SIGNIFICANT CHANGE UP (ref 98–107)
CO2 SERPL-SCNC: 27 MMOL/L — SIGNIFICANT CHANGE UP (ref 22–29)
CREAT SERPL-MCNC: 0.66 MG/DL — SIGNIFICANT CHANGE UP (ref 0.5–1.3)
GLUCOSE SERPL-MCNC: 77 MG/DL — SIGNIFICANT CHANGE UP (ref 70–115)
POTASSIUM SERPL-MCNC: 4.3 MMOL/L — SIGNIFICANT CHANGE UP (ref 3.5–5.3)
POTASSIUM SERPL-SCNC: 4.3 MMOL/L — SIGNIFICANT CHANGE UP (ref 3.5–5.3)
PROT SERPL-MCNC: 7.5 G/DL — SIGNIFICANT CHANGE UP (ref 6.6–8.7)
SODIUM SERPL-SCNC: 142 MMOL/L — SIGNIFICANT CHANGE UP (ref 135–145)

## 2018-08-05 RX ADMIN — Medication 750 MILLIGRAM(S): at 05:39

## 2018-08-05 RX ADMIN — Medication 20 MILLIGRAM(S): at 05:39

## 2018-08-05 RX ADMIN — SODIUM CHLORIDE 250 MILLILITER(S): 9 INJECTION INTRAMUSCULAR; INTRAVENOUS; SUBCUTANEOUS at 01:03

## 2018-08-05 RX ADMIN — Medication 0.5 MILLIGRAM(S): at 22:28

## 2018-08-05 RX ADMIN — SODIUM CHLORIDE 1000 MILLILITER(S): 9 INJECTION INTRAMUSCULAR; INTRAVENOUS; SUBCUTANEOUS at 02:38

## 2018-08-05 RX ADMIN — HALOPERIDOL DECANOATE 5 MILLIGRAM(S): 100 INJECTION INTRAMUSCULAR at 12:31

## 2018-08-05 RX ADMIN — Medication 10 MILLIGRAM(S): at 05:39

## 2018-08-05 RX ADMIN — Medication 20 MILLIGRAM(S): at 01:03

## 2018-08-05 RX ADMIN — Medication 0.5 MILLIGRAM(S): at 05:38

## 2018-08-05 RX ADMIN — Medication 750 MILLIGRAM(S): at 18:51

## 2018-08-05 NOTE — ED ADULT NURSE REASSESSMENT NOTE - COMFORT CARE
plan of care explained/meal provided/repositioned/warm blanket provided/assisted to bathroom/po fluids offered/wait time explained
repositioned/warm blanket provided/assisted to bathroom/darkened lights
meal provided/ambulated to bathroom/po fluids offered/darkened lights/warm blanket provided

## 2018-08-05 NOTE — ED ADULT NURSE REASSESSMENT NOTE - GENERAL PATIENT STATE
comfortable appearance/cooperative
comfortable appearance/cooperative/drowsy
comfortable appearance/resting/sleeping
cooperative/comfortable appearance/resting/sleeping

## 2018-08-05 NOTE — ED ADULT NURSE REASSESSMENT NOTE - DESCRIPTION
Patient has been resting/sleeping. Ate well at lunch and dinner and returned to bed. Good po fluid intake. Declined standing Ativan 0.5 at 1400, as he stated he didn't need it. Has been in good behavioral control, no violence or aggression toward staff or peers. Denied any pain/discomfort. Generally polite and pleasant during interactions with staff. VSS and no distress evident. Continuing to monitor and reassess. Awaiting transfer to inpatient care when bed becomes available.

## 2018-08-06 ENCOUNTER — INPATIENT (INPATIENT)
Facility: HOSPITAL | Age: 78
LOS: 48 days | Discharge: HOME | End: 2018-09-24
Attending: PSYCHIATRY & NEUROLOGY | Admitting: PSYCHIATRY & NEUROLOGY
Payer: MEDICARE

## 2018-08-06 VITALS
RESPIRATION RATE: 18 BRPM | SYSTOLIC BLOOD PRESSURE: 116 MMHG | TEMPERATURE: 98 F | DIASTOLIC BLOOD PRESSURE: 73 MMHG | OXYGEN SATURATION: 100 % | HEART RATE: 104 BPM

## 2018-08-06 VITALS — WEIGHT: 171.96 LBS | HEIGHT: 74 IN | RESPIRATION RATE: 18 BRPM | TEMPERATURE: 98 F

## 2018-08-06 DIAGNOSIS — F33.9 MAJOR DEPRESSIVE DISORDER, RECURRENT, UNSPECIFIED: ICD-10-CM

## 2018-08-06 PROBLEM — Z78.9 OTHER SPECIFIED HEALTH STATUS: Chronic | Status: ACTIVE | Noted: 2017-10-26

## 2018-08-06 PROCEDURE — 85027 COMPLETE CBC AUTOMATED: CPT

## 2018-08-06 PROCEDURE — 96375 TX/PRO/DX INJ NEW DRUG ADDON: CPT

## 2018-08-06 PROCEDURE — 70450 CT HEAD/BRAIN W/O DYE: CPT

## 2018-08-06 PROCEDURE — 99212 OFFICE O/P EST SF 10 MIN: CPT

## 2018-08-06 PROCEDURE — 81001 URINALYSIS AUTO W/SCOPE: CPT

## 2018-08-06 PROCEDURE — 80053 COMPREHEN METABOLIC PANEL: CPT

## 2018-08-06 PROCEDURE — 93005 ELECTROCARDIOGRAM TRACING: CPT

## 2018-08-06 PROCEDURE — 80307 DRUG TEST PRSMV CHEM ANLYZR: CPT

## 2018-08-06 PROCEDURE — 96374 THER/PROPH/DIAG INJ IV PUSH: CPT

## 2018-08-06 PROCEDURE — 36415 COLL VENOUS BLD VENIPUNCTURE: CPT

## 2018-08-06 PROCEDURE — 96376 TX/PRO/DX INJ SAME DRUG ADON: CPT

## 2018-08-06 PROCEDURE — 80048 BASIC METABOLIC PNL TOTAL CA: CPT

## 2018-08-06 PROCEDURE — 99285 EMERGENCY DEPT VISIT HI MDM: CPT | Mod: 25

## 2018-08-06 PROCEDURE — 71045 X-RAY EXAM CHEST 1 VIEW: CPT

## 2018-08-06 PROCEDURE — 96361 HYDRATE IV INFUSION ADD-ON: CPT

## 2018-08-06 RX ORDER — PROPRANOLOL HCL 160 MG
10 CAPSULE, EXTENDED RELEASE 24HR ORAL DAILY
Qty: 0 | Refills: 0 | Status: DISCONTINUED | OUTPATIENT
Start: 2018-08-06 | End: 2018-09-18

## 2018-08-06 RX ORDER — CHOLECALCIFEROL (VITAMIN D3) 125 MCG
1000 CAPSULE ORAL DAILY
Qty: 0 | Refills: 0 | Status: DISCONTINUED | OUTPATIENT
Start: 2018-08-06 | End: 2018-09-24

## 2018-08-06 RX ORDER — FUROSEMIDE 40 MG
20 TABLET ORAL DAILY
Qty: 0 | Refills: 0 | Status: DISCONTINUED | OUTPATIENT
Start: 2018-08-06 | End: 2018-08-06

## 2018-08-06 RX ORDER — VALPROIC ACID (AS SODIUM SALT) 250 MG/5ML
750 SOLUTION, ORAL ORAL
Qty: 0 | Refills: 0 | Status: DISCONTINUED | OUTPATIENT
Start: 2018-08-06 | End: 2018-08-28

## 2018-08-06 RX ORDER — VALPROIC ACID (AS SODIUM SALT) 250 MG/5ML
1 SOLUTION, ORAL ORAL
Qty: 0 | Refills: 0 | Status: DISCONTINUED | OUTPATIENT
Start: 2018-08-06 | End: 2018-08-06

## 2018-08-06 RX ORDER — FUROSEMIDE 40 MG
20 TABLET ORAL DAILY
Qty: 0 | Refills: 0 | Status: DISCONTINUED | OUTPATIENT
Start: 2018-08-06 | End: 2018-09-05

## 2018-08-06 RX ORDER — DOCUSATE SODIUM 100 MG
100 CAPSULE ORAL
Qty: 0 | Refills: 0 | Status: DISCONTINUED | OUTPATIENT
Start: 2018-08-06 | End: 2018-08-30

## 2018-08-06 RX ORDER — RISPERIDONE 4 MG/1
1 TABLET ORAL
Qty: 0 | Refills: 0 | Status: DISCONTINUED | OUTPATIENT
Start: 2018-08-06 | End: 2018-08-08

## 2018-08-06 RX ADMIN — Medication 0.5 MILLIGRAM(S): at 06:39

## 2018-08-06 RX ADMIN — Medication 0.5 MILLIGRAM(S): at 21:59

## 2018-08-06 RX ADMIN — RISPERIDONE 1 MILLIGRAM(S): 4 TABLET ORAL at 21:59

## 2018-08-06 RX ADMIN — Medication 10 MILLIGRAM(S): at 06:39

## 2018-08-06 RX ADMIN — Medication 750 MILLIGRAM(S): at 21:59

## 2018-08-06 RX ADMIN — Medication 20 MILLIGRAM(S): at 06:38

## 2018-08-06 RX ADMIN — Medication 750 MILLIGRAM(S): at 06:40

## 2018-08-06 NOTE — ED BEHAVIORAL HEALTH NOTE - BEHAVIORAL HEALTH NOTE
Social Work Note: as per MD patient would benefit from inpatient psychiatric treatment at this time. This writer placed call to [facility], as patient was originally accepted there last night and spoke with Galilea in central intake who reports she will review case and get back to this writer. sw to follow.

## 2018-08-06 NOTE — ED BEHAVIORAL HEALTH NOTE - BEHAVIORAL HEALTH NOTE
PROGRESS NOTE: 08-06-18 @ 10:31  	  • Reason for Ongoing Consultation: 	delusional behavior    ID: [id number] Male with HEALTH ISSUES - PROBLEM Dx:  Schizoaffective disorder-chronic with exacerbation          INTERVAL DATA:   • Interval Chief Complaint: delusional behavior   • Interval History: Chart reviewed and patient seen in chair watching TV. Pt reports mood " being good". Pt starting to talk about FDR and reports helping him come up with the New Deal and being cooperative with FDR. 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 Asked patient if he wanted to return to [facility] and he state " No I live here now."  Pt reports being compliant with medications but states " I do not like the Haldol green pill, it sexually excites me and gives me an erection." Patient denies SI, AH, VH, depressive symptoms of anhedonia, hopelessness or guilt.     REVIEW OF SYSTEMS:   • Constitutional Symptoms	No complaints  • Eyes	No complaints  • Ears / Nose / Throat / Mouth	No complaints  • Cardiovascular	No complaints  • Respiratory	No complaints  • Gastrointestinal	No complaints  • Genitourinary	No complaints  • Musculoskeletal	No complaints  • Skin	No complaints  • Neurological	No complaints  • Psychiatric (see HPI)	See HPI  • Endocrine	No complaints  • Hematologic / Lymphatic	No complaints  • Allergic / Immunologic	No complaints    REVIEW OF VITALS/LABS/IMAGING/INVESTIGATIONS:   • Vital signs reviewed: Yes  • Vital Signs:	    T(C): 36.4 (08-06-18 @ 10:18), Max: 37.1 (08-06-18 @ 00:07)  HR: 104 (08-06-18 @ 10:18) (61 - 104)  BP: 116/73 (08-06-18 @ 10:18) (93/59 - 125/81)  RR: 18 (08-06-18 @ 10:18) (18 - 20)  SpO2: 100% (08-06-18 @ 10:18) (97% - 100%)    • Available labs reviewed: Yes  • Available Lab Results:       08-05    142  |  104  |  15.0  ----------------------------<  78  4.3   |  27.0  |  0.66    Ca    11.6<H>      05 Aug 2018 06:16    TPro  7.5  /  Alb  4.4  /  TBili  0.4  /  DBili  x   /  AST  16  /  ALT  8   /  AlkPhos  52  08-05    LIVER FUNCTIONS - ( 05 Aug 2018 06:16 )  Alb: 4.4 g/dL / Pro: 7.5 g/dL / ALK PHOS: 52 U/L / ALT: 8 U/L / AST: 16 U/L / GGT: x                   MEDICATIONS:      PRN Medications: none  • PRN Medications since last evaluation	  • PRN Details	    Current Medications:   furosemide    Tablet 20 milliGRAM(s) Oral daily  haloperidol     Tablet 5 milliGRAM(s) Oral daily  Lorazepam     Tablet 0.5 milliGRAM(s) Oral three times a day  propranolol 10 milliGRAM(s) Oral daily  valproic  acid Syrup 750 milliGRAM(s) Oral two times a day     Medication Side Effects:  • Medication Side Effects or Adverse Reactions (new or ongoing)	None known    MENTAL STATUS EXAM:   • Level of Consciousness	Alert  • General Appearance	Well developed  • Body Habitus	well nourished  • Hygiene	fair  • Grooming	fair, unkept   • Behavior	Cooperative  • Eye Contact	Good  • Relatedness	poor  • Impulse Control	Normal  • Muscle Tone / Strength	Normal muscle tone/strength  • Abnormal Movements	No abnormal movements  • Gait / Station	Normal gait / station  • Speech Volume	soft  • Speech Rate	slowed  • Speech Spontaneity	paucity  • Speech Articulation	slurring  • Mood	Normal  • Affect Quality	Euthymic  • Affect Range	Full  • Affect Congruence	Congruent  • Thought Process	tangential, flight of ideas,   • Thought Associations	loose  • Thought Content	delusions, bizarre  • Perceptions          none  • Oriented to Time	Yes  • Oriented to Place	no  • Oriented to Situation	no  • Oriented to Person	Yes  • Attention / Concentration	Normal  • Estimated Intelligence	Average  • Recent Memory	impaired  • Remote Memory	impaired  • Fund of Knowledge	impaired  • Language	No abnormalities noted  • Judgment (regarding everyday events)	poor  • Insight (regarding psychiatric illness)	poor  Pleasant and cooperative.     SUICIDALITY:   • Suicidality (Interval)	none known    HOMICIDALITY/AGGRESSION:   • Homicidality/Aggression	none known    DIAGNOSIS DSM-V:    Psychiatric Diagnosis (Corresponds to DSM-IV Axis I, II):   HEALTH ISSUES - PROBLEM Dx:  Schizoaffective disorder-chronic with exacerbation           Medical Diagnosis (Corresponds to DSM-IV Axis III):  • Axis III	    PAST MEDICAL & SURGICAL HISTORY:  Hyponatremia  Hypertension  Thrombocytopenia  Hyperparathyroidism    ASSESSMENT OF CURRENT CONDITION:   Summary (include case differential, formulation and patient response to therapy): Pt is a 77 y/o male, long term resident at [facility] with psychiatric history of  chronic schizoaffective disorder presenting with acute exacerbation. Delusional behavior and thinking.  requiring inpatient admission for acute exacerbation of schizoaffective disorder.     Risk Assessment (consider static vs modifiable risk factors and protective factors; comment on level of risk for dangerous behavior): low risk: no SI, No HI, command hallucinations, depression, depressive symptoms.     PLAN  transfer to [facility] for Chronic schizoaffective disorder with acute exacerbation , for delusional thinking/behavior.

## 2018-08-06 NOTE — ED ADULT NURSE REASSESSMENT NOTE - NS ED NURSE REASSESS COMMENT FT1
Dr. Redd was made aware that [facility] will not accept pt with elevated calcium.  Pt will go to main ED to receive IV fluids and Lasix.  Awaiting to give handoff to Main ED RN, pt in no acute distress at this present time.  Will continue to monitor and maintain safety.
Pt calm and cooperative at this time no aggressive behaviors, PO fluids offered and encouraged.  Will continue to monitor and maintain safety.
Pt currently resting in no acute distress at this present time, Chest rise present, restless in bed at times,  Will continue to monitor and maintain safety.
assumed care of pt at this time. pt transferred from  to main ED. per Psych RN, pt cannot be accepted at  until Ca is lower, meds ordered to be given. pt a+ox3, in no apparent distress or discomfort. will continue to monitor.
pt  a+ox3, refusing IV, IV fluids and medication. pt states "I don't need it and I don't want it." MD Yousif informed.
pt a+ox3, ambulating with steady gait to bathroom, in no apparent distress or discomfort. repeat blood work drawn and sent to lab. awaiting results. will continue to monitor.
pt awakened, requesting food, pt provided nourishment and consumed, pt voided in urinal. will continue to monitor.
pt observed sleeping in room 6, resp appear even and unlabored with no acute distress observed.
pt sleeping comfortably, in no apparent distress or discomfort, will continue to monitor.
pt sleeping easily aroused to receive evening medication, tolerated well will continue to monitor.
report given to lauren in BH
MD Estrada bedside d/t agitation and aggressive behavior, deescalation techniques utilized and unsuccessful,  security remains bedside. Pt requiring IV medications for sedation @ this time for patient and staff safety per MD.
Pt currently resting/sleeping comfortably on stretcher, in no distress. Respirations even and unlabored. No harm to self or others. Will continue to monitor the pt and maintain safety.
Received Pt drowsy on stretcher. IV removed. Pt cooperative with security check. Pt unsteady on feet, shuffling his feet. Pt assisted to bathroom. Urine collect. Pt mumbling, states he got mad at "Aisha" because he didn't want to take his medication. Pt denies any current suicidal ideations or A/V/H. Pt states, "I want to go home". Plan of care explained to pt. Comfort measures provided to pt. Will continue to monitor the pt and maintain safety.
report received from previous RN, patient asleep at this time, received ativan and haldol by prior RN, patient does not appear to be in any apparent distress at this time awaiting dispo will continue to monitor patient
pt received in a yellow gown, awake and c/o hunger and pain to his neck 5/10. pt with iv access 20 gauge to his left hand, saline locked with no signs of infiltration. breathing even and unlaboured ,pt moving all extremities. pt is hypotensive but asymptomatic, will continue to monitor
pt s/p reevaluation cleared to be transferred back to . report given to rn by MD. pt tolerated breakfast well, no agitation ,or violent behaviour at this time. safety maintained

## 2018-08-06 NOTE — ED BEHAVIORAL HEALTH NOTE - BEHAVIORAL HEALTH NOTE
Social Work Note: patient has been accepted at [facility] at this time confirmed with Galilea in Central Intake [phone]. Accepting MD is Dr. Pressley and patient will be going to [address] [facility] scheduled for transfer. Social Work Note: patient has been accepted at [facility] at this time confirmed with Galilea in Central Intake [phone]. Accepting MD is Dr. Pressley and patient will be going to [address] [facility] scheduled for transfer spoke with Valeriy at [facility].

## 2018-08-07 RX ADMIN — Medication 100 MILLIGRAM(S): at 08:38

## 2018-08-07 RX ADMIN — Medication 0.5 MILLIGRAM(S): at 08:38

## 2018-08-07 RX ADMIN — Medication 1000 UNIT(S): at 08:38

## 2018-08-07 RX ADMIN — Medication 0.5 MILLIGRAM(S): at 13:38

## 2018-08-07 RX ADMIN — Medication 100 MILLIGRAM(S): at 21:08

## 2018-08-07 RX ADMIN — RISPERIDONE 1 MILLIGRAM(S): 4 TABLET ORAL at 21:08

## 2018-08-07 RX ADMIN — RISPERIDONE 1 MILLIGRAM(S): 4 TABLET ORAL at 08:38

## 2018-08-07 RX ADMIN — Medication 10 MILLIGRAM(S): at 08:38

## 2018-08-07 RX ADMIN — Medication 0.5 MILLIGRAM(S): at 21:08

## 2018-08-07 RX ADMIN — Medication 20 MILLIGRAM(S): at 08:38

## 2018-08-07 RX ADMIN — Medication 750 MILLIGRAM(S): at 21:18

## 2018-08-07 RX ADMIN — Medication 750 MILLIGRAM(S): at 08:38

## 2018-08-07 NOTE — CHART NOTE - NSCHARTNOTEFT_GEN_A_CORE
Screening Medical Evaluation  Patient Admitted from:  ED    [facility] admitting diagnosis: Recurrent major depressive disorder    PAST MEDICAL & SURGICAL HISTORY:  Hyponatremia  Hypertension  Thrombocytopenia  Hyperparathyroidism  Schizoaffective disorder  Poor historian  Schizo affective schizophrenia: bipolar type  No significant past surgical history        Allergies    Allergy Status Unknown    Intolerances        Social History:     FAMILY HISTORY:  No pertinent family history in first degree relatives      MEDICATIONS  (STANDING):  cholecalciferol 1000 Unit(s) Oral daily  docusate sodium 100 milliGRAM(s) Oral two times a day  furosemide    Tablet 20 milliGRAM(s) Oral daily  LORazepam     Tablet 0.5 milliGRAM(s) Oral three times a day  propranolol 10 milliGRAM(s) Oral daily  risperiDONE   Tablet 1 milliGRAM(s) Oral two times a day  valproic  acid Syrup 750 milliGRAM(s) Oral two times a day    MEDICATIONS  (PRN):  LORazepam     Tablet 0.5 milliGRAM(s) Oral every 6 hours PRN Agitation  LORazepam   Injectable 0.5 milliGRAM(s) IntraMuscular once PRN Agitation      Vital Signs Last 24 Hrs  T(C): 36.7 (06 Aug 2018 15:21), Max: 36.7 (06 Aug 2018 15:21)  T(F): 98 (06 Aug 2018 15:21), Max: 98 (06 Aug 2018 15:21)  HR: 104 (06 Aug 2018 10:18) (62 - 104)  BP: 116/73 (06 Aug 2018 10:18) (93/59 - 116/73)  RR: 18 (06 Aug 2018 13:45) (18 - 20)  SpO2: 100% (06 Aug 2018 10:18) (100% - 100%)  CAPILLARY BLOOD GLUCOSE            PHYSICAL EXAM:  GENERAL: NAD, well-developed  HEAD:  Atraumatic, Normocephalic  EYES: EOMI, PERRLA, conjunctiva and sclera clear  NECK: Supple.  CHEST/LUNG: Clear to auscultation bilaterally; No wheezes noted.  HEART: Regular rate and rhythm; +s1 and +s2; No murmurs, rubs, or gallops  ABDOMEN: Soft, Nontender, Nondistended; Normoactive Bowel sounds present  EXTREMITIES:  2+ Peripheral Pulses, No cyanosis, or edema  PSYCH: AAOx3  NEUROLOGY: non-focal  SKIN: No rashes or lesions    LABS:    08-05    142  |  104  |  15.0  ----------------------------<  78  4.3   |  27.0  |  0.66    Ca    11.6<H>      05 Aug 2018 06:16    TPro  7.5  /  Alb  4.4  /  TBili  0.4  /  DBili  x   /  AST  16  /  ALT  8   /  AlkPhos  52  08-05              RADIOLOGY & ADDITIONAL TESTS:    Assessment and Plan:  78 year old male presenting today from  ED to [facility] with admitting diagnosis of Recurrent major depressive disorder with PMH of Hyponatremia, Hypertension, hyperparathyroidism, DM, and osteopenia. Pt residing in [facility] admitted due to aggression toward nursing staff and non-compliance with medication. Denies any medical concerns at this time. Denies any fever, chills, headache, chest pain, SOB, abdominal pain, N/V/D/C.  1)	HTN: Continue with Lasix 20 mg oral daily and propranolol 10 mg oral daily.  2)	Vitamin D deficiency: Continue cholecalciferol 1000 units daily.  3)	Follow up with CBC, CMP, and U/A in AM.  4)	Constipation: Continue with Colace 100 mg BID oral.  5)	Recurrent major depressive disorder: follow care plan as per primary psych team.

## 2018-08-08 PROCEDURE — 99232 SBSQ HOSP IP/OBS MODERATE 35: CPT

## 2018-08-08 RX ORDER — RISPERIDONE 4 MG/1
2 TABLET ORAL
Qty: 0 | Refills: 0 | Status: DISCONTINUED | OUTPATIENT
Start: 2018-08-08 | End: 2018-08-13

## 2018-08-08 RX ADMIN — Medication 0.5 MILLIGRAM(S): at 13:27

## 2018-08-08 RX ADMIN — Medication 750 MILLIGRAM(S): at 08:57

## 2018-08-08 RX ADMIN — Medication 750 MILLIGRAM(S): at 20:18

## 2018-08-08 RX ADMIN — Medication 0.5 MILLIGRAM(S): at 20:19

## 2018-08-08 RX ADMIN — Medication 20 MILLIGRAM(S): at 08:56

## 2018-08-08 RX ADMIN — Medication 0.5 MILLIGRAM(S): at 08:56

## 2018-08-09 PROCEDURE — 99232 SBSQ HOSP IP/OBS MODERATE 35: CPT

## 2018-08-09 RX ADMIN — Medication 750 MILLIGRAM(S): at 08:39

## 2018-08-09 RX ADMIN — Medication 0.5 MILLIGRAM(S): at 12:41

## 2018-08-09 RX ADMIN — Medication 750 MILLIGRAM(S): at 20:33

## 2018-08-09 RX ADMIN — RISPERIDONE 2 MILLIGRAM(S): 4 TABLET ORAL at 08:39

## 2018-08-09 RX ADMIN — RISPERIDONE 2 MILLIGRAM(S): 4 TABLET ORAL at 20:33

## 2018-08-09 RX ADMIN — Medication 1000 UNIT(S): at 08:39

## 2018-08-09 RX ADMIN — Medication 0.5 MILLIGRAM(S): at 20:33

## 2018-08-09 RX ADMIN — Medication 20 MILLIGRAM(S): at 08:39

## 2018-08-09 RX ADMIN — Medication 0.5 MILLIGRAM(S): at 08:39

## 2018-08-09 RX ADMIN — Medication 10 MILLIGRAM(S): at 08:39

## 2018-08-10 PROCEDURE — 99232 SBSQ HOSP IP/OBS MODERATE 35: CPT

## 2018-08-10 RX ADMIN — RISPERIDONE 2 MILLIGRAM(S): 4 TABLET ORAL at 20:19

## 2018-08-10 RX ADMIN — Medication 0.5 MILLIGRAM(S): at 20:19

## 2018-08-10 RX ADMIN — Medication 750 MILLIGRAM(S): at 09:09

## 2018-08-10 RX ADMIN — Medication 0.5 MILLIGRAM(S): at 12:48

## 2018-08-10 RX ADMIN — Medication 0.5 MILLIGRAM(S): at 09:09

## 2018-08-11 PROCEDURE — 99232 SBSQ HOSP IP/OBS MODERATE 35: CPT

## 2018-08-11 RX ADMIN — Medication 750 MILLIGRAM(S): at 21:29

## 2018-08-11 RX ADMIN — Medication 0.5 MILLIGRAM(S): at 13:43

## 2018-08-11 RX ADMIN — Medication 0.5 MILLIGRAM(S): at 09:59

## 2018-08-11 RX ADMIN — RISPERIDONE 2 MILLIGRAM(S): 4 TABLET ORAL at 21:30

## 2018-08-11 RX ADMIN — Medication 0.5 MILLIGRAM(S): at 21:30

## 2018-08-12 PROCEDURE — 99231 SBSQ HOSP IP/OBS SF/LOW 25: CPT

## 2018-08-12 RX ADMIN — Medication 0.5 MILLIGRAM(S): at 13:24

## 2018-08-12 RX ADMIN — Medication 0.5 MILLIGRAM(S): at 09:15

## 2018-08-13 PROCEDURE — 99232 SBSQ HOSP IP/OBS MODERATE 35: CPT

## 2018-08-13 RX ORDER — RISPERIDONE 4 MG/1
2 TABLET ORAL DAILY
Qty: 0 | Refills: 0 | Status: DISCONTINUED | OUTPATIENT
Start: 2018-08-13 | End: 2018-08-28

## 2018-08-13 RX ORDER — RISPERIDONE 4 MG/1
3 TABLET ORAL AT BEDTIME
Qty: 0 | Refills: 0 | Status: DISCONTINUED | OUTPATIENT
Start: 2018-08-13 | End: 2018-08-28

## 2018-08-14 PROCEDURE — 99232 SBSQ HOSP IP/OBS MODERATE 35: CPT

## 2018-08-15 PROCEDURE — 99232 SBSQ HOSP IP/OBS MODERATE 35: CPT

## 2018-08-16 PROCEDURE — 99232 SBSQ HOSP IP/OBS MODERATE 35: CPT

## 2018-08-16 RX ORDER — HALOPERIDOL DECANOATE 100 MG/ML
1 INJECTION INTRAMUSCULAR ONCE
Qty: 0 | Refills: 0 | Status: DISCONTINUED | OUTPATIENT
Start: 2018-08-16 | End: 2018-08-21

## 2018-08-16 RX ORDER — HALOPERIDOL DECANOATE 100 MG/ML
1 INJECTION INTRAMUSCULAR EVERY 4 HOURS
Qty: 0 | Refills: 0 | Status: DISCONTINUED | OUTPATIENT
Start: 2018-08-16 | End: 2018-09-18

## 2018-08-17 PROCEDURE — 99232 SBSQ HOSP IP/OBS MODERATE 35: CPT

## 2018-08-18 PROCEDURE — 99232 SBSQ HOSP IP/OBS MODERATE 35: CPT

## 2018-08-18 RX ADMIN — HALOPERIDOL DECANOATE 1 MILLIGRAM(S): 100 INJECTION INTRAMUSCULAR at 12:11

## 2018-08-18 RX ADMIN — Medication 0.5 MILLIGRAM(S): at 12:09

## 2018-08-19 PROCEDURE — 99231 SBSQ HOSP IP/OBS SF/LOW 25: CPT

## 2018-08-19 RX ADMIN — Medication 0.5 MILLIGRAM(S): at 10:14

## 2018-08-19 RX ADMIN — HALOPERIDOL DECANOATE 1 MILLIGRAM(S): 100 INJECTION INTRAMUSCULAR at 10:14

## 2018-08-20 PROCEDURE — 99232 SBSQ HOSP IP/OBS MODERATE 35: CPT

## 2018-08-21 PROCEDURE — 99232 SBSQ HOSP IP/OBS MODERATE 35: CPT

## 2018-08-21 RX ORDER — HALOPERIDOL DECANOATE 100 MG/ML
5 INJECTION INTRAMUSCULAR ONCE
Qty: 0 | Refills: 0 | Status: DISCONTINUED | OUTPATIENT
Start: 2018-08-21 | End: 2018-08-21

## 2018-08-21 RX ORDER — HALOPERIDOL DECANOATE 100 MG/ML
5 INJECTION INTRAMUSCULAR ONCE
Qty: 0 | Refills: 0 | Status: DISCONTINUED | OUTPATIENT
Start: 2018-08-21 | End: 2018-09-18

## 2018-08-21 RX ADMIN — RISPERIDONE 2 MILLIGRAM(S): 4 TABLET ORAL at 12:03

## 2018-08-21 RX ADMIN — Medication 750 MILLIGRAM(S): at 20:43

## 2018-08-21 RX ADMIN — Medication 0.5 MILLIGRAM(S): at 20:43

## 2018-08-21 RX ADMIN — RISPERIDONE 3 MILLIGRAM(S): 4 TABLET ORAL at 20:43

## 2018-08-21 RX ADMIN — Medication 0.5 MILLIGRAM(S): at 12:03

## 2018-08-22 PROCEDURE — 99232 SBSQ HOSP IP/OBS MODERATE 35: CPT

## 2018-08-23 PROCEDURE — 99232 SBSQ HOSP IP/OBS MODERATE 35: CPT

## 2018-08-24 PROCEDURE — 99232 SBSQ HOSP IP/OBS MODERATE 35: CPT

## 2018-08-24 RX ADMIN — RISPERIDONE 3 MILLIGRAM(S): 4 TABLET ORAL at 21:18

## 2018-08-24 RX ADMIN — Medication 0.5 MILLIGRAM(S): at 21:18

## 2018-08-24 RX ADMIN — RISPERIDONE 2 MILLIGRAM(S): 4 TABLET ORAL at 11:49

## 2018-08-24 RX ADMIN — Medication 0.5 MILLIGRAM(S): at 11:49

## 2018-08-24 RX ADMIN — Medication 750 MILLIGRAM(S): at 21:18

## 2018-08-25 PROCEDURE — 99231 SBSQ HOSP IP/OBS SF/LOW 25: CPT

## 2018-08-25 RX ADMIN — Medication 0.5 MILLIGRAM(S): at 22:42

## 2018-08-25 RX ADMIN — Medication 750 MILLIGRAM(S): at 21:22

## 2018-08-25 RX ADMIN — RISPERIDONE 3 MILLIGRAM(S): 4 TABLET ORAL at 21:22

## 2018-08-26 PROCEDURE — 99231 SBSQ HOSP IP/OBS SF/LOW 25: CPT

## 2018-08-26 RX ADMIN — HALOPERIDOL DECANOATE 1 MILLIGRAM(S): 100 INJECTION INTRAMUSCULAR at 15:28

## 2018-08-26 RX ADMIN — Medication 0.5 MILLIGRAM(S): at 20:53

## 2018-08-26 RX ADMIN — RISPERIDONE 3 MILLIGRAM(S): 4 TABLET ORAL at 20:53

## 2018-08-26 RX ADMIN — Medication 750 MILLIGRAM(S): at 20:53

## 2018-08-26 RX ADMIN — Medication 0.5 MILLIGRAM(S): at 15:28

## 2018-08-27 PROCEDURE — 99232 SBSQ HOSP IP/OBS MODERATE 35: CPT

## 2018-08-27 PROCEDURE — 99223 1ST HOSP IP/OBS HIGH 75: CPT

## 2018-08-27 RX ADMIN — RISPERIDONE 3 MILLIGRAM(S): 4 TABLET ORAL at 20:49

## 2018-08-27 RX ADMIN — Medication 0.5 MILLIGRAM(S): at 20:49

## 2018-08-27 RX ADMIN — Medication 750 MILLIGRAM(S): at 20:49

## 2018-08-27 NOTE — CONSULT NOTE ADULT - ASSESSMENT
78 year old male with psych disorder now with exacerbation and multiple medical problems refusing medications going to court for meds over objection.  1.  HTN:  PT REFUSING VS AND NOT TAKING LASIX 20 MG QD.  ONLY VS ON ADMISSION 164/100   2.  HYPERPARATHYROIDISM WITH CALCIUM 11.6.  PUSH PO FLUIDS.  3.  THROMBOCYTOPENIA:  NOW STABLE 160  4.  HYPONATREMIA NOW STABLE 142  5.  PSYCH:  AGREE WITH GOING TO COURT FOR MEDS OVER OBJECTION.  TREATING PT PSYCHIATRICALLY WILL ALLOW PT TO UNDERSTAND HIS MEDICAL PROBLEMS LEADING TO IMPROVED COMPLIANCE AND BETTER MEDICAL OUTCOMES.

## 2018-08-27 NOTE — CONSULT NOTE ADULT - SUBJECTIVE AND OBJECTIVE BOX
HPI:   78 year old male with psych disorder now with exacerbation and multiple medical problems refusing medications.    +HTN REFUSING BP MEDS LASIX 20 MG QD PROPRANOLOL 10 MG QD WITH ONLY BLOOD PRESSURE READING ON ADMISSION 164/100 106.  +HYPERPARATHYROIDISM WITH ELEVATED CALCIUM.  +VIT D DEFICIENCY.    PAST MEDICAL & SURGICAL HISTORY:  Hyponatremia  Hypertension  Thrombocytopenia  Hyperparathyroidism  Schizoaffective disorder  Poor historian  Schizo affective schizophrenia: bipolar type  No significant past surgical history      Review of Systems:   CONSTITUTIONAL: No fever, weight loss, or fatigue  EYES: No eye pain, visual disturbances, or discharge  ENMT:  No difficulty hearing, tinnitus, vertigo; No sinus or throat pain  NECK: No pain or stiffness  RESPIRATORY: No cough, wheezing, chills or hemoptysis; No shortness of breath  CARDIOVASCULAR: No chest pain, palpitations, dizziness, or leg swelling  GASTROINTESTINAL: No abdominal or epigastric pain. No nausea, vomiting, or hematemesis; No diarrhea or constipation. No melena or hematochezia.  GENITOURINARY: No dysuria, frequency, hematuria, or incontinence  NEUROLOGICAL: No headaches, memory loss, loss of strength, numbness, or tremors  SKIN: No itching, burning, rashes, or lesions   LYMPH NODES: No enlarged glands  ENDOCRINE: No heat or cold intolerance; No hair loss  MUSCULOSKELETAL: No joint pain or swelling; No muscle, back, or extremity pain  PSYCHIATRIC: No depression, anxiety, mood swings, or difficulty sleeping  HEME/LYMPH: No easy bruising, or bleeding gums  ALLERY AND IMMUNOLOGIC: No hives or eczema    Allergies    Allergy Status Unknown    Social History:  -CIGS, -ETOH, -DRUGS    FAMILY HISTORY:  No pertinent family history in first degree relatives      MEDICATIONS  (STANDING):  cholecalciferol 1000 Unit(s) Oral daily  docusate sodium 100 milliGRAM(s) Oral two times a day  furosemide    Tablet 20 milliGRAM(s) Oral daily  LORazepam     Tablet 0.5 milliGRAM(s) Oral three times a day  propranolol 10 milliGRAM(s) Oral daily  risperiDONE   Tablet 3 milliGRAM(s) Oral at bedtime  risperiDONE   Tablet 2 milliGRAM(s) Oral daily  valproic  acid Syrup 750 milliGRAM(s) Oral two times a day    MEDICATIONS  (PRN):  haloperidol     Tablet 1 milliGRAM(s) Oral every 4 hours PRN agitation  haloperidol    Injectable 5 milliGRAM(s) IntraMuscular once PRN agitation  LORazepam   Injectable 2 milliGRAM(s) IntraMuscular once PRN Agitation    VS: PT REFUSES VITAL SIGNS.  LAST VS FROM ADMISSION 164/100 106    PHYSICAL EXAM:  GENERAL: NAD, well-developed  HEAD:  Atraumatic, Normocephalic  EYES: EOMI, conjunctiva and sclera clear  NECK: Supple, No JVD  CHEST/LUNG: Clear to auscultation bilaterally; No wheeze  HEART: Regular rate and rhythm; No murmurs, rubs, or gallops  ABDOMEN: Soft, Nontender, Nondistended; Bowel sounds present  EXTREMITIES:   No clubbing, cyanosis, or edema  NEUROLOGY: non-focal  SKIN: No rashes or lesions    LABS:  2018   K 4.3  CO2 27 GLUC 77 BUN 15 CR 0.66 CA 11.6 PROT 7.5 ALB 4.4  ALK PHOS 52 SGOT 16 SGPT 8    2018  WBC 4.5 H/H 11.9/36.5 PLAT 160    EK2018  NSR J POINT ELEVATED REPOLARIZATION ABNORMALITY QTC.  .437    RADIOLOGY & ADDITIONAL TESTS:  2018  CXR SMALL LEFT PLEURAL EFFUSION  2018  CT OF BRAIN:  NO HEMORRHAGE OR MASS EFFECT    Consultant(s) Notes Reviewed:  NOTES REVIEWED.    Care Discussed with Consultants/Other Providers:  ATTENDING DR. HOROWITZ, STAFF AND LEGAL TEAM.

## 2018-08-28 PROCEDURE — 99232 SBSQ HOSP IP/OBS MODERATE 35: CPT

## 2018-08-28 RX ORDER — RISPERIDONE 4 MG/1
1 TABLET ORAL DAILY
Qty: 0 | Refills: 0 | Status: DISCONTINUED | OUTPATIENT
Start: 2018-08-28 | End: 2018-09-04

## 2018-08-28 RX ORDER — VALPROIC ACID (AS SODIUM SALT) 250 MG/5ML
1250 SOLUTION, ORAL ORAL AT BEDTIME
Qty: 0 | Refills: 0 | Status: DISCONTINUED | OUTPATIENT
Start: 2018-08-28 | End: 2018-09-04

## 2018-08-28 RX ORDER — VALPROIC ACID (AS SODIUM SALT) 250 MG/5ML
250 SOLUTION, ORAL ORAL DAILY
Qty: 0 | Refills: 0 | Status: DISCONTINUED | OUTPATIENT
Start: 2018-08-28 | End: 2018-09-04

## 2018-08-28 RX ORDER — RISPERIDONE 4 MG/1
4 TABLET ORAL AT BEDTIME
Qty: 0 | Refills: 0 | Status: DISCONTINUED | OUTPATIENT
Start: 2018-08-28 | End: 2018-09-04

## 2018-08-28 RX ADMIN — Medication 1250 MILLIGRAM(S): at 20:43

## 2018-08-28 RX ADMIN — RISPERIDONE 4 MILLIGRAM(S): 4 TABLET ORAL at 20:43

## 2018-08-28 RX ADMIN — Medication 1 MILLIGRAM(S): at 20:43

## 2018-08-29 PROCEDURE — 99232 SBSQ HOSP IP/OBS MODERATE 35: CPT

## 2018-08-29 RX ADMIN — Medication 250 MILLIGRAM(S): at 09:14

## 2018-08-29 RX ADMIN — RISPERIDONE 1 MILLIGRAM(S): 4 TABLET ORAL at 09:14

## 2018-08-29 RX ADMIN — Medication 1250 MILLIGRAM(S): at 22:09

## 2018-08-29 RX ADMIN — RISPERIDONE 4 MILLIGRAM(S): 4 TABLET ORAL at 22:09

## 2018-08-29 RX ADMIN — Medication 1 MILLIGRAM(S): at 22:09

## 2018-08-29 RX ADMIN — Medication 0.5 MILLIGRAM(S): at 09:14

## 2018-08-29 RX ADMIN — Medication 20 MILLIGRAM(S): at 09:14

## 2018-08-30 PROCEDURE — 99232 SBSQ HOSP IP/OBS MODERATE 35: CPT

## 2018-08-30 RX ADMIN — Medication 1250 MILLIGRAM(S): at 21:45

## 2018-08-30 RX ADMIN — Medication 1 MILLIGRAM(S): at 21:45

## 2018-08-30 RX ADMIN — Medication 250 MILLIGRAM(S): at 09:32

## 2018-08-30 RX ADMIN — RISPERIDONE 4 MILLIGRAM(S): 4 TABLET ORAL at 21:45

## 2018-08-30 RX ADMIN — RISPERIDONE 1 MILLIGRAM(S): 4 TABLET ORAL at 09:32

## 2018-08-30 RX ADMIN — Medication 20 MILLIGRAM(S): at 09:32

## 2018-08-30 RX ADMIN — Medication 0.5 MILLIGRAM(S): at 09:32

## 2018-08-31 PROCEDURE — 99232 SBSQ HOSP IP/OBS MODERATE 35: CPT

## 2018-08-31 RX ADMIN — Medication 1 MILLIGRAM(S): at 21:03

## 2018-08-31 RX ADMIN — RISPERIDONE 4 MILLIGRAM(S): 4 TABLET ORAL at 21:03

## 2018-08-31 RX ADMIN — Medication 1250 MILLIGRAM(S): at 21:03

## 2018-09-01 RX ADMIN — Medication 1 MILLIGRAM(S): at 21:08

## 2018-09-01 RX ADMIN — RISPERIDONE 4 MILLIGRAM(S): 4 TABLET ORAL at 21:08

## 2018-09-01 RX ADMIN — Medication 1250 MILLIGRAM(S): at 21:08

## 2018-09-02 RX ADMIN — Medication 0.5 MILLIGRAM(S): at 10:41

## 2018-09-02 RX ADMIN — Medication 1 MILLIGRAM(S): at 20:31

## 2018-09-02 RX ADMIN — Medication 250 MILLIGRAM(S): at 10:41

## 2018-09-02 RX ADMIN — RISPERIDONE 4 MILLIGRAM(S): 4 TABLET ORAL at 20:31

## 2018-09-02 RX ADMIN — Medication 1250 MILLIGRAM(S): at 20:31

## 2018-09-02 RX ADMIN — RISPERIDONE 1 MILLIGRAM(S): 4 TABLET ORAL at 10:41

## 2018-09-03 RX ADMIN — Medication 1250 MILLIGRAM(S): at 21:13

## 2018-09-03 RX ADMIN — Medication 1000 UNIT(S): at 09:34

## 2018-09-03 RX ADMIN — Medication 250 MILLIGRAM(S): at 09:34

## 2018-09-03 RX ADMIN — Medication 0.5 MILLIGRAM(S): at 09:34

## 2018-09-03 RX ADMIN — Medication 10 MILLIGRAM(S): at 09:34

## 2018-09-03 RX ADMIN — Medication 20 MILLIGRAM(S): at 09:34

## 2018-09-03 RX ADMIN — RISPERIDONE 4 MILLIGRAM(S): 4 TABLET ORAL at 21:13

## 2018-09-03 RX ADMIN — RISPERIDONE 1 MILLIGRAM(S): 4 TABLET ORAL at 09:34

## 2018-09-03 RX ADMIN — Medication 1 MILLIGRAM(S): at 21:13

## 2018-09-04 PROCEDURE — 99233 SBSQ HOSP IP/OBS HIGH 50: CPT

## 2018-09-04 PROCEDURE — 99232 SBSQ HOSP IP/OBS MODERATE 35: CPT

## 2018-09-04 RX ORDER — RISPERIDONE 4 MG/1
5 TABLET ORAL AT BEDTIME
Qty: 0 | Refills: 0 | Status: DISCONTINUED | OUTPATIENT
Start: 2018-09-04 | End: 2018-09-18

## 2018-09-04 RX ORDER — VALPROIC ACID (AS SODIUM SALT) 250 MG/5ML
1500 SOLUTION, ORAL ORAL AT BEDTIME
Qty: 0 | Refills: 0 | Status: DISCONTINUED | OUTPATIENT
Start: 2018-09-04 | End: 2018-09-18

## 2018-09-04 RX ADMIN — Medication 1 MILLIGRAM(S): at 21:41

## 2018-09-04 RX ADMIN — RISPERIDONE 5 MILLIGRAM(S): 4 TABLET ORAL at 21:41

## 2018-09-04 RX ADMIN — Medication 1500 MILLIGRAM(S): at 21:41

## 2018-09-04 RX ADMIN — Medication 0.5 MILLIGRAM(S): at 09:56

## 2018-09-04 NOTE — CONSULT NOTE ADULT - ASSESSMENT
78 year old male with psych disorder now with exacerbation and several medical problems refusing medications going to court for meds over objection.  1.  HTN:  PT REFUSING BLOOD PRESSURE MEDS AND BLOOD PRESSURE WNL.  2.  HYPERPARATHYROIDISM WITH CALCIUM 11.6.  CHECK PTH.  PUSH FLUIDS.  3.  THROMBOCYTOPENIA:  STABLE 160  4.  HYPONATREMIA:  STABLE 142  5.  PSYCH:  AGREE WITH GOING TO COURT FOR MEDS OVER OBJECTION.  TREATING PT PSYCHIATRICALLY WILL ALLOW PT TO UNDERSTAND HIS MEDICAL PROBLEMS LEADING TO IMPROVED COMPLIANCE AND BETTER OUTCOMES.

## 2018-09-04 NOTE — CONSULT NOTE ADULT - SUBJECTIVE AND OBJECTIVE BOX
HPI:     78 year old male with psych disorder now with exacerbation with several medical problems refusing medications.    Pt with HTN on Lasix 20 mg qd and Propranolol 10 mg qd.  Pt refusing medications.  BP today     PAST MEDICAL & SURGICAL HISTORY:  Hyponatremia  Hypertension  Thrombocytopenia  Hyperparathyroidism  Schizoaffective disorder  Poor historian  Schizo affective schizophrenia: bipolar type  No significant past surgical history      Review of Systems:   CONSTITUTIONAL: No fever, weight loss, or fatigue  EYES: No eye pain, visual disturbances, or discharge  ENMT:  No difficulty hearing, tinnitus, vertigo; No sinus or throat pain  NECK: No pain or stiffness  BREASTS: No pain, masses, or nipple discharge  RESPIRATORY: No cough, wheezing, chills or hemoptysis; No shortness of breath  CARDIOVASCULAR: No chest pain, palpitations, dizziness, or leg swelling  GASTROINTESTINAL: No abdominal or epigastric pain. No nausea, vomiting, or hematemesis; No diarrhea or constipation. No melena or hematochezia.  GENITOURINARY: No dysuria, frequency, hematuria, or incontinence  NEUROLOGICAL: No headaches, memory loss, loss of strength, numbness, or tremors  SKIN: No itching, burning, rashes, or lesions   LYMPH NODES: No enlarged glands  ENDOCRINE: No heat or cold intolerance; No hair loss  MUSCULOSKELETAL: No joint pain or swelling; No muscle, back, or extremity pain  PSYCHIATRIC: No depression, anxiety, mood swings, or difficulty sleeping  HEME/LYMPH: No easy bruising, or bleeding gums  ALLERY AND IMMUNOLOGIC: No hives or eczema    Allergies    Allergy Status Unknown    Intolerances        Social History:     FAMILY HISTORY:  No pertinent family history in first degree relatives      MEDICATIONS  (STANDING):  cholecalciferol 1000 Unit(s) Oral daily  furosemide    Tablet 20 milliGRAM(s) Oral daily  LORazepam     Tablet 0.5 milliGRAM(s) Oral daily  LORazepam     Tablet 1 milliGRAM(s) Oral at bedtime  propranolol 10 milliGRAM(s) Oral daily  risperiDONE   Tablet 4 milliGRAM(s) Oral at bedtime  risperiDONE   Tablet 1 milliGRAM(s) Oral daily  valproic  acid Syrup 250 milliGRAM(s) Oral daily  valproic  acid Syrup 1250 milliGRAM(s) Oral at bedtime    MEDICATIONS  (PRN):  haloperidol     Tablet 1 milliGRAM(s) Oral every 4 hours PRN agitation  haloperidol    Injectable 5 milliGRAM(s) IntraMuscular once PRN agitation        CAPILLARY BLOOD GLUCOSE        I&O's Summary      PHYSICAL EXAM:  GENERAL: NAD, well-developed  HEAD:  Atraumatic, Normocephalic  EYES: EOMI, PERRLA, conjunctiva and sclera clear  NECK: Supple, No JVD  CHEST/LUNG: Clear to auscultation bilaterally; No wheeze  HEART: Regular rate and rhythm; No murmurs, rubs, or gallops  ABDOMEN: Soft, Nontender, Nondistended; Bowel sounds present  EXTREMITIES:  2+ Peripheral Pulses, No clubbing, cyanosis, or edema  PSYCH: AAOx3  NEUROLOGY: non-focal  SKIN: No rashes or lesions    LABS:                    EKG:    RADIOLOGY & ADDITIONAL TESTS:    Imaging Personally Reviewed:    Consultant(s) Notes Reviewed:      Care Discussed with Consultants/Other Providers: HPI:     78 year old male with psych disorder now with exacerbation with several medical problems refusing medications.    Pt with HTN on Lasix 20 mg qd and Propranolol 10 mg qd.  Pt refusing medications.  BP today 107/69 69 with no blood pressure medicine.  Pt with Hyperparathyroidism. refusing blood work..  Vit D deficiency.    PAST MEDICAL & SURGICAL HISTORY:  Hyponatremia  Hypertension  Thrombocytopenia  Hyperparathyroidism  Schizoaffective disorder  Poor historian  Schizo affective schizophrenia: bipolar type  No significant past surgical history        Allergies    Allergy Status Unknown    Social History:   -CIGS, -ETOH, -DRUGS.    FAMILY HISTORY:  No pertinent family history in first degree relatives  MOTHER , FATHER     MEDICATIONS  (STANDING):  cholecalciferol 1000 Unit(s) Oral daily  furosemide    Tablet 20 milliGRAM(s) Oral daily  LORazepam     Tablet 0.5 milliGRAM(s) Oral daily  LORazepam     Tablet 1 milliGRAM(s) Oral at bedtime  propranolol 10 milliGRAM(s) Oral daily  risperiDONE   Tablet 4 milliGRAM(s) Oral at bedtime  risperiDONE   Tablet 1 milliGRAM(s) Oral daily  valproic  acid Syrup 250 milliGRAM(s) Oral daily  valproic  acid Syrup 1250 milliGRAM(s) Oral at bedtime    MEDICATIONS  (PRN):  haloperidol     Tablet 1 milliGRAM(s) Oral every 4 hours PRN agitation  haloperidol    Injectable 5 milliGRAM(s) IntraMuscular once PRN agitation    VS:  107/69 69    PHYSICAL EXAM:  GENERAL: NAD, well-developed  HEAD:  Atraumatic, Normocephalic  EYES: EOMI,  conjunctiva and sclera clear  EXTREMITIES:  No clubbing, cyanosis, or edema  NEUROLOGY: non-focal  SKIN: No rashes or lesions  PT REFUSED FURTHER TREATMENT    EK2018  NSR J POINT ELEVATION QTC .437    RADIOLOGY & ADDITIONAL TESTS:      Consultant(s) Notes Reviewed:    NOTES REVIEWED    Care Discussed with Consultants/Other Providers:  ATTENDING DR. HOROWITZ, LEGAL TEAM AND STAFF.

## 2018-09-05 LAB
ALBUMIN SERPL ELPH-MCNC: 4 G/DL — SIGNIFICANT CHANGE UP (ref 3.3–5)
ALP SERPL-CCNC: 61 U/L — SIGNIFICANT CHANGE UP (ref 40–120)
ALT FLD-CCNC: 9 U/L — SIGNIFICANT CHANGE UP (ref 4–41)
AST SERPL-CCNC: 12 U/L — SIGNIFICANT CHANGE UP (ref 4–40)
BASOPHILS # BLD AUTO: 0.01 K/UL — SIGNIFICANT CHANGE UP (ref 0–0.2)
BASOPHILS NFR BLD AUTO: 0.2 % — SIGNIFICANT CHANGE UP (ref 0–2)
BILIRUB SERPL-MCNC: < 0.2 MG/DL — LOW (ref 0.2–1.2)
BUN SERPL-MCNC: 16 MG/DL — SIGNIFICANT CHANGE UP (ref 7–23)
CA-I BLD-SCNC: 1.56 MMOL/L — HIGH (ref 1.03–1.23)
CALCIUM SERPL-MCNC: 12 MG/DL — HIGH (ref 8.4–10.5)
CHLORIDE SERPL-SCNC: 103 MMOL/L — SIGNIFICANT CHANGE UP (ref 98–107)
CHOLEST SERPL-MCNC: 152 MG/DL — SIGNIFICANT CHANGE UP (ref 120–199)
CO2 SERPL-SCNC: 30 MMOL/L — SIGNIFICANT CHANGE UP (ref 22–31)
CREAT SERPL-MCNC: 0.78 MG/DL — SIGNIFICANT CHANGE UP (ref 0.5–1.3)
EOSINOPHIL # BLD AUTO: 0.06 K/UL — SIGNIFICANT CHANGE UP (ref 0–0.5)
EOSINOPHIL NFR BLD AUTO: 1.4 % — SIGNIFICANT CHANGE UP (ref 0–6)
FOLATE SERPL-MCNC: 4.7 NG/ML — SIGNIFICANT CHANGE UP (ref 4.7–20)
GLUCOSE SERPL-MCNC: 89 MG/DL — SIGNIFICANT CHANGE UP (ref 70–99)
HCT VFR BLD CALC: 38.2 % — LOW (ref 39–50)
HDLC SERPL-MCNC: 60 MG/DL — HIGH (ref 35–55)
HGB BLD-MCNC: 12.1 G/DL — LOW (ref 13–17)
IMM GRANULOCYTES # BLD AUTO: 0.03 # — SIGNIFICANT CHANGE UP
IMM GRANULOCYTES NFR BLD AUTO: 0.7 % — SIGNIFICANT CHANGE UP (ref 0–1.5)
LIPID PNL WITH DIRECT LDL SERPL: 84 MG/DL — SIGNIFICANT CHANGE UP
LYMPHOCYTES # BLD AUTO: 1.63 K/UL — SIGNIFICANT CHANGE UP (ref 1–3.3)
LYMPHOCYTES # BLD AUTO: 38.7 % — SIGNIFICANT CHANGE UP (ref 13–44)
MCHC RBC-ENTMCNC: 30.6 PG — SIGNIFICANT CHANGE UP (ref 27–34)
MCHC RBC-ENTMCNC: 31.7 % — LOW (ref 32–36)
MCV RBC AUTO: 96.5 FL — SIGNIFICANT CHANGE UP (ref 80–100)
MONOCYTES # BLD AUTO: 0.4 K/UL — SIGNIFICANT CHANGE UP (ref 0–0.9)
MONOCYTES NFR BLD AUTO: 9.5 % — SIGNIFICANT CHANGE UP (ref 2–14)
NEUTROPHILS # BLD AUTO: 2.08 K/UL — SIGNIFICANT CHANGE UP (ref 1.8–7.4)
NEUTROPHILS NFR BLD AUTO: 49.5 % — SIGNIFICANT CHANGE UP (ref 43–77)
NRBC # FLD: 0 — SIGNIFICANT CHANGE UP
PLATELET # BLD AUTO: 172 K/UL — SIGNIFICANT CHANGE UP (ref 150–400)
PMV BLD: 11 FL — SIGNIFICANT CHANGE UP (ref 7–13)
POTASSIUM SERPL-MCNC: 4.5 MMOL/L — SIGNIFICANT CHANGE UP (ref 3.5–5.3)
POTASSIUM SERPL-SCNC: 4.5 MMOL/L — SIGNIFICANT CHANGE UP (ref 3.5–5.3)
PROT SERPL-MCNC: 7.1 G/DL — SIGNIFICANT CHANGE UP (ref 6–8.3)
PTH-INTACT SERPL-MCNC: 164.5 PG/ML — HIGH (ref 15–65)
RBC # BLD: 3.96 M/UL — LOW (ref 4.2–5.8)
RBC # FLD: 13.9 % — SIGNIFICANT CHANGE UP (ref 10.3–14.5)
SODIUM SERPL-SCNC: 142 MMOL/L — SIGNIFICANT CHANGE UP (ref 135–145)
TRIGL SERPL-MCNC: 84 MG/DL — SIGNIFICANT CHANGE UP (ref 10–149)
TSH SERPL-MCNC: 1.73 UIU/ML — SIGNIFICANT CHANGE UP (ref 0.27–4.2)
VALPROATE SERPL-MCNC: 77.6 UG/ML — SIGNIFICANT CHANGE UP (ref 50–100)
VIT B12 SERPL-MCNC: 411 PG/ML — SIGNIFICANT CHANGE UP (ref 200–900)
WBC # BLD: 4.21 K/UL — SIGNIFICANT CHANGE UP (ref 3.8–10.5)
WBC # FLD AUTO: 4.21 K/UL — SIGNIFICANT CHANGE UP (ref 3.8–10.5)

## 2018-09-05 PROCEDURE — 99232 SBSQ HOSP IP/OBS MODERATE 35: CPT

## 2018-09-05 RX ORDER — BENZTROPINE MESYLATE 1 MG
0.5 TABLET ORAL AT BEDTIME
Qty: 0 | Refills: 0 | Status: DISCONTINUED | OUTPATIENT
Start: 2018-09-05 | End: 2018-09-06

## 2018-09-05 RX ADMIN — Medication 20 MILLIGRAM(S): at 09:00

## 2018-09-05 RX ADMIN — Medication 1000 UNIT(S): at 09:00

## 2018-09-05 RX ADMIN — Medication 1 MILLIGRAM(S): at 21:10

## 2018-09-05 RX ADMIN — Medication 0.5 MILLIGRAM(S): at 21:10

## 2018-09-05 RX ADMIN — Medication 10 MILLIGRAM(S): at 09:00

## 2018-09-05 RX ADMIN — Medication 1500 MILLIGRAM(S): at 21:09

## 2018-09-05 RX ADMIN — RISPERIDONE 5 MILLIGRAM(S): 4 TABLET ORAL at 21:09

## 2018-09-05 RX ADMIN — Medication 0.5 MILLIGRAM(S): at 09:00

## 2018-09-06 DIAGNOSIS — E21.3 HYPERPARATHYROIDISM, UNSPECIFIED: ICD-10-CM

## 2018-09-06 DIAGNOSIS — E55.9 VITAMIN D DEFICIENCY, UNSPECIFIED: ICD-10-CM

## 2018-09-06 LAB — T PALLIDUM AB TITR SER: NEGATIVE — SIGNIFICANT CHANGE UP

## 2018-09-06 PROCEDURE — 99232 SBSQ HOSP IP/OBS MODERATE 35: CPT

## 2018-09-06 PROCEDURE — 99222 1ST HOSP IP/OBS MODERATE 55: CPT

## 2018-09-06 RX ORDER — CINACALCET 30 MG/1
30 TABLET, FILM COATED ORAL
Qty: 0 | Refills: 0 | Status: DISCONTINUED | OUTPATIENT
Start: 2018-09-06 | End: 2018-09-24

## 2018-09-06 RX ORDER — BENZTROPINE MESYLATE 1 MG
1 TABLET ORAL AT BEDTIME
Qty: 0 | Refills: 0 | Status: DISCONTINUED | OUTPATIENT
Start: 2018-09-06 | End: 2018-09-18

## 2018-09-06 RX ADMIN — Medication 1500 MILLIGRAM(S): at 20:59

## 2018-09-06 RX ADMIN — Medication 10 MILLIGRAM(S): at 09:36

## 2018-09-06 RX ADMIN — Medication 0.5 MILLIGRAM(S): at 09:36

## 2018-09-06 RX ADMIN — CINACALCET 30 MILLIGRAM(S): 30 TABLET, FILM COATED ORAL at 20:59

## 2018-09-06 RX ADMIN — Medication 1000 UNIT(S): at 09:36

## 2018-09-06 RX ADMIN — Medication 1 MILLIGRAM(S): at 20:59

## 2018-09-06 RX ADMIN — RISPERIDONE 5 MILLIGRAM(S): 4 TABLET ORAL at 20:59

## 2018-09-06 NOTE — CONSULT NOTE ADULT - CONSULT REASON
Asked by attending to see this 78 year old male with psych disorder now with exacerbation admitted from [facility] with multiple medical problems now refusing medications
Hypercalcemia
Asked by attending to follow up with this 78 year old patient noncompliant with medications going to court for meds over objection.  Pt has a hx of HTN and Hyperparathyroidism with elevated calcium, Vit D deficiency.

## 2018-09-06 NOTE — CONSULT NOTE ADULT - ASSESSMENT
77 y/o male 79 y/o male with intact PTH dependent hypercalcemia in the setting of normal renal function and normal GFR is most likely due to primary hyperparathyroidism. Hypercalcemia (Ca 10.8-12) has been persistent and stable since early 2017 and continues to be 12.0 at this time. It is unlikely that cognitive symptoms are due to hypercalcemia given chronic hypercalcemia. Patient has a h/o radial head fracture but it is unclear if that was in the setting of trauma or with osteoporosis. Bones on imaging appear osteopenic with degenerative changes. Vitamin D status is not known.

## 2018-09-06 NOTE — CONSULT NOTE ADULT - SUBJECTIVE AND OBJECTIVE BOX
Reason For Consult: Hypercalcemia    HPI: 77 y/o male with h/o schizoaffective disorder here with acute delusions.       PAST MEDICAL & SURGICAL HISTORY:  Hyponatremia  Hypertension  Thrombocytopenia  Hyperparathyroidism  Schizoaffective disorder  Poor historian  Schizo affective schizophrenia: bipolar type  No significant past surgical history      FAMILY HISTORY:  No pertinent family history in first degree relatives      Social History:    Outpatient Medications:  As listed in MAR:  Fosamax 70 mg once/week  Ergo calciferol 50,000 units twice/month  Cholecalciferol 1000 units QD  Valproic Acid  Cinacalcet 30 mg TID  Propranolol   Haldol  Lasix   Ativan    MEDICATIONS  (STANDING):  benztropine 1 milliGRAM(s) Oral at bedtime  cholecalciferol 1000 Unit(s) Oral daily  LORazepam     Tablet 0.5 milliGRAM(s) Oral daily  LORazepam     Tablet 1 milliGRAM(s) Oral at bedtime  propranolol 10 milliGRAM(s) Oral daily  risperiDONE   Tablet 5 milliGRAM(s) Oral at bedtime  valproic  acid Syrup 1500 milliGRAM(s) Oral at bedtime    MEDICATIONS  (PRN):  haloperidol     Tablet 1 milliGRAM(s) Oral every 4 hours PRN agitation  haloperidol    Injectable 5 milliGRAM(s) IntraMuscular once PRN agitation      Allergies    Allergy Status Unknown    Intolerances      Review of Systems:  Constitutional: No fever  Eyes: No blurry vision  Neuro: No tremors  HEENT: No pain  Cardiovascular: No chest pain, palpitations  Respiratory: No SOB, no cough  GI: No nausea, vomiting, abdominal pain  : No dysuria  Skin: no rash  Psych: no depression  Endocrine: no polyuria, polydipsia  Hem/lymph: no swelling  Osteoporosis: no fractures    ALL OTHER SYSTEMS REVIEWED AND NEGATIVE    UNABLE TO OBTAIN    PHYSICAL EXAM:  VITALS: T(C): 36.6 (09-06-18 @ 07:50)  T(F): 97.8 (09-06-18 @ 07:50), Max: 98 (09-05-18 @ 15:56)  HR: --  BP: --  RR:  (18 - 18)  SpO2: --  Wt(kg): --  GENERAL: NAD, well-groomed, well-developed  EYES: No proptosis, no lid lag, anicteric  HEENT:  Atraumatic, Normocephalic, moist mucous membranes  THYROID: Normal size, no palpable nodules  RESPIRATORY: Clear to auscultation bilaterally; No rales, rhonchi, wheezing, or rubs  CARDIOVASCULAR: Regular rate and rhythm; No murmurs; no peripheral edema  GI: Soft, nontender, non distended, normal bowel sounds  SKIN: Dry, intact, No rashes or lesions  MUSCULOSKELETAL: Full range of motion, normal strength  NEURO: sensation intact, extraocular movements intact, no tremor, normal reflexes  PSYCH: Alert and oriented x 3, normal affect, normal mood  CUSHING'S SIGNS: no striae                              12.1   4.21  )-----------( 172      ( 05 Sep 2018 16:45 )             38.2       09-05    142  |  103  |  16  ----------------------------<  89  4.5   |  30  |  0.78    EGFR if : 101  EGFR if non : 87    Ca    12.0<H>      09-05    TPro  7.1  /  Alb  4.0  /  TBili  < 0.2<L>  /  DBili  x   /  AST  12  /  ALT  9   /  AlkPhos  61  09-05      Thyroid Function Tests:  09-05 @ 16:45   TSH 1.73 FreeT4 -- T3 -- Anti TPO -- Anti Thyroglobulin Ab -- TSI --    09-05 Chol 152 LDL 84 HDL 60<H> Trig 84 Reason For Consult: Hypercalcemia    HPI: 79 y/o male with h/o schizoaffective disorder here with acute delusions.   Patient continues to have delusions at this time and states "I do not have hypercalcemia and I don not need an endocrinologist" when I discuss hypercalcemia. Patient is very irritated by my questions and states he is healthy and he does not need a doctor. Patient has no constipation, history of kidney stones and no says he has no history of osteoporosis or fractures. In March 2017, he was noted to have radial head fracture on X-Ray but other imaging tests (X-ray and CT C-spine mention osteopenia and degenerative changes). Patient does not know his medication names and does not know his pharmacy name. He admits to not drinking milk. No other history was obtainable. There are no DXA studies in medical records to confirm indication for bisphosphonates. As per home medications, with no documented pharmacy, patient is known to have Fosamax listed, Sensipar 30 mg TID, and Ergocalciferol 50,000 units twice/month and Cholecalciferol 1000 mg QD listed but the accuracy of his listed cannot be confirmed and there is no pharmacy listed.    Discussed his care with the inpatient psychiatrist who stated that patient had been refusing all medications while in-patient and only started taking medication 1-2 days ago. Clinically his delusions has slightly improved with some days where he is again with significant symptoms.      PAST MEDICAL & SURGICAL HISTORY:  Hyponatremia  Hypertension  Thrombocytopenia  Hyperparathyroidism  Schizoaffective disorder  Poor historian  Schizo affective schizophrenia: bipolar type  No significant past surgical history      FAMILY HISTORY:  Unable to Obtain    Social History: Unable to Obtain    Outpatient Medications:  As listed in MAR:  Fosamax 70 mg once/week  Ergo calciferol 50,000 units twice/month  Cholecalciferol 1000 units QD  Valproic Acid  Cinacalcet 30 mg TID  Propranolol   Haldol  Lasix   Ativan    MEDICATIONS  (STANDING):  benztropine 1 milliGRAM(s) Oral at bedtime  cholecalciferol 1000 Unit(s) Oral daily  LORazepam     Tablet 0.5 milliGRAM(s) Oral daily  LORazepam     Tablet 1 milliGRAM(s) Oral at bedtime  propranolol 10 milliGRAM(s) Oral daily  risperiDONE   Tablet 5 milliGRAM(s) Oral at bedtime  valproic  acid Syrup 1500 milliGRAM(s) Oral at bedtime    MEDICATIONS  (PRN):  haloperidol     Tablet 1 milliGRAM(s) Oral every 4 hours PRN agitation  haloperidol    Injectable 5 milliGRAM(s) IntraMuscular once PRN agitation      Allergies  Allergy Status Unknown      Review of Systems:  UNABLE TO OBTAIN    PHYSICAL EXAM:  VITALS: T(C): 36.6 (09-06-18 @ 07:50)  T(F): 97.8 (09-06-18 @ 07:50), Max: 98 (09-05-18 @ 15:56)    GENERAL: NAD, well-groomed, well-developed, thin male  EYES: No proptosis, no lid lag, anicteric  HEENT:  Atraumatic, Normocephalic, dry mucous membranes, Adentulous  THYROID: Normal size, no palpable nodules, limited exam in supine position. Patient not willing to sit up  RESPIRATORY: Clear to auscultation bilaterally; No rales, rhonchi, wheezing, or rubs  CARDIOVASCULAR: Regular rate and rhythm; No murmurs; no peripheral edema  GI: Soft, nontender, non distended, normal bowel sounds  SKIN: Dry, intact, No rashes or lesions  MUSCULOSKELETAL: Full range of motion, normal strength  NEURO: sensation intact, extraocular movements intact, no tremor  PSYCH: Alert and oriented x 2, Irritable affect                                12.1   4.21  )-----------( 172      ( 05 Sep 2018 16:45 )             38.2       09-05    142  |  103  |  16  ----------------------------<  89  4.5   |  30  |  0.78    EGFR if : 101  EGFR if non : 87    Ca    12.0<H>      09-05    TPro  7.1  /  Alb  4.0  /  TBili  < 0.2<L>  /  DBili  x   /  AST  12  /  ALT  9   /  AlkPhos  61  09-05      Thyroid Function Tests:  09-05 @ 16:45   TSH 1.73 FreeT4 -- T3 -- Anti TPO -- Anti Thyroglobulin Ab -- TSI --    09-05 Chol 152 LDL 84 HDL 60<H> Trig 84

## 2018-09-06 NOTE — CONSULT NOTE ADULT - PROBLEM SELECTOR RECOMMENDATION 2
- Recommend check serum calcium every 2-3 days until improved  - Recommend check Vitamin D 25-OH and 1,25-OH and then consider replacement    Our service will follow BMP and Ca/Vitamin D labs and clinical condition    D/w psychiatrist Dr. Jose Cruz Persaud,   Pager 9AM-5PM: [phone]  Pager Nights and Weekends: [phone]

## 2018-09-06 NOTE — CONSULT NOTE ADULT - PROBLEM SELECTOR RECOMMENDATION 9
- Hypercalcemia is likely due to primary hyperparathyroidism  - Ca is stable 10.6-12.0 since early 2017 and cognitive symptoms are unlikely due to hypercalcemia. Recommend aggressive PO Fluids and advised the RN and aide to keep a water pitcher at bedside  - If Calcium>14, would recommend IVF, Calcitonin 4IU/kg IM Q12 x 2 doses. We will also recommend Pamidronate 60-90 mg (based on renal function) x 1 dose at the time  - Presently, recommend Sensipar 30 mg BID as patient is not a surgical candidate  - Recommend check Vitamin D 25-OH and Vitamin D 1,25-OH with calcium levels at next venipuncture in 2-3 days. Recommend repeat serum Ca every 2-3 days until levels are near-normal with Sensipar    Outpatient Plan  - To be determined based on Calcium levels  - F/u [facility], call [phone] for appointment

## 2018-09-07 PROCEDURE — 99232 SBSQ HOSP IP/OBS MODERATE 35: CPT

## 2018-09-07 RX ADMIN — RISPERIDONE 5 MILLIGRAM(S): 4 TABLET ORAL at 21:26

## 2018-09-07 RX ADMIN — CINACALCET 30 MILLIGRAM(S): 30 TABLET, FILM COATED ORAL at 21:25

## 2018-09-07 RX ADMIN — Medication 1 MILLIGRAM(S): at 21:25

## 2018-09-07 RX ADMIN — Medication 1 MILLIGRAM(S): at 21:26

## 2018-09-07 RX ADMIN — Medication 1500 MILLIGRAM(S): at 21:26

## 2018-09-07 RX ADMIN — Medication 0.5 MILLIGRAM(S): at 09:52

## 2018-09-08 RX ADMIN — RISPERIDONE 5 MILLIGRAM(S): 4 TABLET ORAL at 20:57

## 2018-09-08 RX ADMIN — Medication 1000 UNIT(S): at 09:03

## 2018-09-08 RX ADMIN — Medication 0.5 MILLIGRAM(S): at 09:03

## 2018-09-08 RX ADMIN — Medication 1 MILLIGRAM(S): at 20:57

## 2018-09-08 RX ADMIN — CINACALCET 30 MILLIGRAM(S): 30 TABLET, FILM COATED ORAL at 09:03

## 2018-09-08 RX ADMIN — Medication 10 MILLIGRAM(S): at 09:03

## 2018-09-08 RX ADMIN — CINACALCET 30 MILLIGRAM(S): 30 TABLET, FILM COATED ORAL at 20:57

## 2018-09-08 RX ADMIN — Medication 1500 MILLIGRAM(S): at 21:51

## 2018-09-09 RX ADMIN — Medication 1 MILLIGRAM(S): at 21:47

## 2018-09-09 RX ADMIN — CINACALCET 30 MILLIGRAM(S): 30 TABLET, FILM COATED ORAL at 21:47

## 2018-09-09 RX ADMIN — Medication 0.5 MILLIGRAM(S): at 09:01

## 2018-09-09 RX ADMIN — RISPERIDONE 5 MILLIGRAM(S): 4 TABLET ORAL at 21:47

## 2018-09-09 RX ADMIN — Medication 1500 MILLIGRAM(S): at 21:48

## 2018-09-10 PROCEDURE — 99232 SBSQ HOSP IP/OBS MODERATE 35: CPT

## 2018-09-10 RX ADMIN — Medication 1 MILLIGRAM(S): at 21:52

## 2018-09-10 RX ADMIN — Medication 0.5 MILLIGRAM(S): at 09:35

## 2018-09-10 RX ADMIN — Medication 10 MILLIGRAM(S): at 09:35

## 2018-09-10 RX ADMIN — CINACALCET 30 MILLIGRAM(S): 30 TABLET, FILM COATED ORAL at 09:35

## 2018-09-10 RX ADMIN — Medication 1000 UNIT(S): at 09:35

## 2018-09-10 RX ADMIN — CINACALCET 30 MILLIGRAM(S): 30 TABLET, FILM COATED ORAL at 21:52

## 2018-09-10 RX ADMIN — Medication 1500 MILLIGRAM(S): at 21:52

## 2018-09-10 RX ADMIN — RISPERIDONE 5 MILLIGRAM(S): 4 TABLET ORAL at 21:52

## 2018-09-11 RX ADMIN — CINACALCET 30 MILLIGRAM(S): 30 TABLET, FILM COATED ORAL at 20:32

## 2018-09-11 RX ADMIN — Medication 1 MILLIGRAM(S): at 20:32

## 2018-09-11 RX ADMIN — Medication 1500 MILLIGRAM(S): at 20:32

## 2018-09-11 RX ADMIN — Medication 0.5 MILLIGRAM(S): at 09:04

## 2018-09-11 RX ADMIN — RISPERIDONE 5 MILLIGRAM(S): 4 TABLET ORAL at 20:32

## 2018-09-12 PROCEDURE — 99232 SBSQ HOSP IP/OBS MODERATE 35: CPT

## 2018-09-12 RX ADMIN — Medication 0.5 MILLIGRAM(S): at 08:59

## 2018-09-12 RX ADMIN — Medication 1 MILLIGRAM(S): at 20:50

## 2018-09-12 NOTE — ED ADULT NURSE NOTE - MUSCULOSKELETAL WDL
----- Message from Rosa Collado MD sent at 9/11/2018  2:46 PM CDT -----    Please notify the patient of normal results.  Follow-up as planned.     Full range of motion of upper and lower extremities, no joint tenderness/swelling.

## 2018-09-13 PROCEDURE — 99232 SBSQ HOSP IP/OBS MODERATE 35: CPT

## 2018-09-13 RX ORDER — CHOLECALCIFEROL (VITAMIN D3) 125 MCG
1 CAPSULE ORAL
Qty: 0 | Refills: 0 | COMMUNITY

## 2018-09-13 RX ORDER — RISPERIDONE 4 MG/1
1 TABLET ORAL
Qty: 15 | Refills: 0 | OUTPATIENT
Start: 2018-09-13 | End: 2018-09-27

## 2018-09-13 RX ORDER — CINACALCET 30 MG/1
0 TABLET, FILM COATED ORAL
Qty: 0 | Refills: 0 | COMMUNITY

## 2018-09-13 RX ORDER — PROPRANOLOL HCL 160 MG
1 CAPSULE, EXTENDED RELEASE 24HR ORAL
Qty: 15 | Refills: 0 | OUTPATIENT
Start: 2018-09-13 | End: 2018-09-27

## 2018-09-13 RX ORDER — FUROSEMIDE 40 MG
1 TABLET ORAL
Qty: 0 | Refills: 0 | COMMUNITY

## 2018-09-13 RX ORDER — VALPROIC ACID (AS SODIUM SALT) 250 MG/5ML
15 SOLUTION, ORAL ORAL
Qty: 0 | Refills: 0 | COMMUNITY

## 2018-09-13 RX ORDER — LACTULOSE 10 G/15ML
30 SOLUTION ORAL
Qty: 0 | Refills: 0 | COMMUNITY

## 2018-09-13 RX ORDER — ALENDRONATE SODIUM 70 MG/1
1 TABLET ORAL
Qty: 0 | Refills: 0 | COMMUNITY

## 2018-09-13 RX ORDER — DOCUSATE SODIUM 100 MG
2 CAPSULE ORAL
Qty: 0 | Refills: 0 | COMMUNITY

## 2018-09-13 RX ORDER — BENZTROPINE MESYLATE 1 MG
1 TABLET ORAL
Qty: 15 | Refills: 0 | OUTPATIENT
Start: 2018-09-13 | End: 2018-09-27

## 2018-09-13 RX ORDER — ERGOCALCIFEROL 1.25 MG/1
0 CAPSULE ORAL
Qty: 0 | Refills: 0 | COMMUNITY

## 2018-09-13 RX ORDER — VALPROIC ACID (AS SODIUM SALT) 250 MG/5ML
30 SOLUTION, ORAL ORAL
Qty: 450 | Refills: 0 | OUTPATIENT
Start: 2018-09-13 | End: 2018-09-27

## 2018-09-13 RX ORDER — HALOPERIDOL DECANOATE 100 MG/ML
1 INJECTION INTRAMUSCULAR
Qty: 0 | Refills: 0 | COMMUNITY

## 2018-09-13 RX ORDER — HALOPERIDOL DECANOATE 100 MG/ML
300 INJECTION INTRAMUSCULAR
Qty: 0 | Refills: 0 | COMMUNITY

## 2018-09-13 RX ORDER — PROPRANOLOL HCL 160 MG
1 CAPSULE, EXTENDED RELEASE 24HR ORAL
Qty: 0 | Refills: 0 | COMMUNITY

## 2018-09-13 RX ORDER — CHOLECALCIFEROL (VITAMIN D3) 125 MCG
1000 CAPSULE ORAL
Qty: 15000 | Refills: 0 | OUTPATIENT
Start: 2018-09-13 | End: 2018-09-27

## 2018-09-13 RX ORDER — CINACALCET 30 MG/1
1 TABLET, FILM COATED ORAL
Qty: 30 | Refills: 0 | OUTPATIENT
Start: 2018-09-13 | End: 2018-09-27

## 2018-09-13 RX ADMIN — Medication 1500 MILLIGRAM(S): at 21:09

## 2018-09-13 RX ADMIN — CINACALCET 30 MILLIGRAM(S): 30 TABLET, FILM COATED ORAL at 21:09

## 2018-09-13 RX ADMIN — Medication 1 MILLIGRAM(S): at 21:09

## 2018-09-13 RX ADMIN — RISPERIDONE 5 MILLIGRAM(S): 4 TABLET ORAL at 21:09

## 2018-09-13 RX ADMIN — Medication 0.5 MILLIGRAM(S): at 09:07

## 2018-09-14 PROCEDURE — 99232 SBSQ HOSP IP/OBS MODERATE 35: CPT

## 2018-09-14 RX ADMIN — Medication 1 MILLIGRAM(S): at 21:33

## 2018-09-14 RX ADMIN — Medication 0.5 MILLIGRAM(S): at 09:39

## 2018-09-14 RX ADMIN — Medication 1500 MILLIGRAM(S): at 21:33

## 2018-09-14 RX ADMIN — RISPERIDONE 5 MILLIGRAM(S): 4 TABLET ORAL at 21:33

## 2018-09-14 RX ADMIN — CINACALCET 30 MILLIGRAM(S): 30 TABLET, FILM COATED ORAL at 21:33

## 2018-09-15 RX ADMIN — Medication 1 MILLIGRAM(S): at 20:42

## 2018-09-15 RX ADMIN — Medication 0.5 MILLIGRAM(S): at 09:13

## 2018-09-16 RX ADMIN — Medication 1 MILLIGRAM(S): at 20:26

## 2018-09-16 RX ADMIN — Medication 0.5 MILLIGRAM(S): at 09:47

## 2018-09-17 PROCEDURE — 99232 SBSQ HOSP IP/OBS MODERATE 35: CPT

## 2018-09-17 RX ADMIN — Medication 1 MILLIGRAM(S): at 14:30

## 2018-09-17 RX ADMIN — RISPERIDONE 5 MILLIGRAM(S): 4 TABLET ORAL at 20:56

## 2018-09-17 RX ADMIN — CINACALCET 30 MILLIGRAM(S): 30 TABLET, FILM COATED ORAL at 20:56

## 2018-09-17 RX ADMIN — Medication 1500 MILLIGRAM(S): at 20:55

## 2018-09-17 RX ADMIN — Medication 1 MILLIGRAM(S): at 20:56

## 2018-09-17 RX ADMIN — Medication 0.5 MILLIGRAM(S): at 09:08

## 2018-09-17 NOTE — PROGRESS NOTE ADULT - ASSESSMENT
77 y/o male with intact PTH dependent hypercalcemia in the setting of normal renal function and normal GFR is most likely due to primary hyperparathyroidism. Hypercalcemia (Ca 10.8-12) has been persistent and stable since early 2017 and continues to be 12.0 at this time. It is unlikely that cognitive symptoms are due to hypercalcemia given chronic hypercalcemia. Patient has a h/o radial head fracture but it is unclear if that was in the setting of trauma or with osteoporosis. Bones on imaging appear osteopenic with degenerative changes. Vitamin D status is not known.

## 2018-09-17 NOTE — PROGRESS NOTE ADULT - PROBLEM SELECTOR PLAN 1
- Had a lengthy discussion with the psychiatrist regarding patient's care  - It seems that patient has been intermittently refusing oral medications and has not consistently received Sensipar BID, may be QD. He has also been refusing Cholecalciferol and venipuncture  - Recommend continue Sensipar 30 mg BID  - As per psychiatrist, court order may be available by tomorrow so possibly get labs done including BMP on Wednesday, 09/19/18  - PLEASE CHECK BMP, PHOS, MAG AT NEXT VENIPUNCTURE

## 2018-09-17 NOTE — PROGRESS NOTE ADULT - SUBJECTIVE AND OBJECTIVE BOX
Chief Complaint/Follow-up on: Hypercalcemia    Subjective: 77 y/o male with intact PTH dependent hypercalcemia in the setting of normal renal function and normal GFR is most likely due to primary hyperparathyroidism. Hypercalcemia (Ca 10.8-12) has been persistent and stable since early 2017 and continues to be 12.0 as of 09/06/18. It is unlikely that cognitive symptoms are due to hypercalcemia given chronic hypercalcemia.    Patient continues to be defensive during HPI today and states he has no calcium problem. He states no constipation, eating and drinking appropriately. Patient refused a physical exam today.    MEDICATIONS  (STANDING):  benztropine 1 milliGRAM(s) Oral at bedtime  cholecalciferol 1000 Unit(s) Oral daily  cinacalcet 30 milliGRAM(s) Oral two times a day  LORazepam     Tablet 0.5 milliGRAM(s) Oral daily  LORazepam     Tablet 1 milliGRAM(s) Oral at bedtime  LORazepam     Tablet 1 milliGRAM(s) Oral once  propranolol 10 milliGRAM(s) Oral daily  risperiDONE   Tablet 5 milliGRAM(s) Oral at bedtime  valproic  acid Syrup 1500 milliGRAM(s) Oral at bedtime    MEDICATIONS  (PRN):  haloperidol     Tablet 1 milliGRAM(s) Oral every 4 hours PRN agitation  haloperidol    Injectable 5 milliGRAM(s) IntraMuscular once PRN agitation      PHYSICAL EXAM:  VITALS: T(C): 36.9 (09-17-18 @ 05:09)  T(F): 98.5 (09-17-18 @ 05:09), Max: 98.5 (09-17-18 @ 05:09)    Patient refused a physical exam today  General: NAD. Makes eye contact  Heart: Regular rate with no signs of palpitations  Lungs: No accessory muscle use  Extremities: Appears to have no edema.     Thyroid Function Tests:  09-05 @ 16:45 TSH 1.73 FreeT4 -- T3 -- Anti TPO -- Anti Thyroglobulin Ab -- TSI --

## 2018-09-17 NOTE — PROGRESS NOTE ADULT - PROBLEM SELECTOR PLAN 2
- Refusing Cholecalciferol 1000 units QD, recommend continue  - PLEASE CHECK VITAMIN D 25-OH AND VITAMIN D 1,25-OH at next venipuncture    Pam Persaud DO  Pager 9AM-5PM: [phone]  Pager Nights and Weekends: [phone]

## 2018-09-18 PROCEDURE — 99232 SBSQ HOSP IP/OBS MODERATE 35: CPT

## 2018-09-18 RX ORDER — BENZTROPINE MESYLATE 1 MG
1 TABLET ORAL AT BEDTIME
Qty: 0 | Refills: 0 | Status: DISCONTINUED | OUTPATIENT
Start: 2018-09-18 | End: 2018-09-24

## 2018-09-18 RX ORDER — RISPERIDONE 4 MG/1
25 TABLET ORAL ONCE
Qty: 0 | Refills: 0 | Status: COMPLETED | OUTPATIENT
Start: 2018-09-18 | End: 2018-09-18

## 2018-09-18 RX ORDER — VALPROIC ACID (AS SODIUM SALT) 250 MG/5ML
1500 SOLUTION, ORAL ORAL AT BEDTIME
Qty: 0 | Refills: 0 | Status: DISCONTINUED | OUTPATIENT
Start: 2018-09-18 | End: 2018-09-24

## 2018-09-18 RX ORDER — RISPERIDONE 4 MG/1
5 TABLET ORAL AT BEDTIME
Qty: 0 | Refills: 0 | Status: DISCONTINUED | OUTPATIENT
Start: 2018-09-18 | End: 2018-09-24

## 2018-09-18 RX ORDER — PROPRANOLOL HCL 160 MG
10 CAPSULE, EXTENDED RELEASE 24HR ORAL DAILY
Qty: 0 | Refills: 0 | Status: DISCONTINUED | OUTPATIENT
Start: 2018-09-18 | End: 2018-09-24

## 2018-09-18 RX ADMIN — RISPERIDONE 25 MILLIGRAM(S): 4 TABLET ORAL at 21:47

## 2018-09-18 RX ADMIN — Medication 1 MILLIGRAM(S): at 21:07

## 2018-09-18 RX ADMIN — Medication 0.5 MILLIGRAM(S): at 08:17

## 2018-09-18 RX ADMIN — CINACALCET 30 MILLIGRAM(S): 30 TABLET, FILM COATED ORAL at 21:07

## 2018-09-18 RX ADMIN — RISPERIDONE 5 MILLIGRAM(S): 4 TABLET ORAL at 21:07

## 2018-09-18 RX ADMIN — Medication 1500 MILLIGRAM(S): at 21:07

## 2018-09-19 PROCEDURE — 99232 SBSQ HOSP IP/OBS MODERATE 35: CPT

## 2018-09-19 RX ADMIN — CINACALCET 30 MILLIGRAM(S): 30 TABLET, FILM COATED ORAL at 21:20

## 2018-09-19 RX ADMIN — Medication 1500 MILLIGRAM(S): at 21:21

## 2018-09-19 RX ADMIN — Medication 1 MILLIGRAM(S): at 21:20

## 2018-09-19 RX ADMIN — RISPERIDONE 5 MILLIGRAM(S): 4 TABLET ORAL at 21:21

## 2018-09-19 RX ADMIN — Medication 0.5 MILLIGRAM(S): at 13:35

## 2018-09-20 LAB
ALBUMIN SERPL ELPH-MCNC: 4.2 G/DL — SIGNIFICANT CHANGE UP (ref 3.3–5)
ALP SERPL-CCNC: 53 U/L — SIGNIFICANT CHANGE UP (ref 40–120)
ALT FLD-CCNC: 8 U/L — SIGNIFICANT CHANGE UP (ref 4–41)
AST SERPL-CCNC: 12 U/L — SIGNIFICANT CHANGE UP (ref 4–40)
BASOPHILS # BLD AUTO: 0.03 K/UL — SIGNIFICANT CHANGE UP (ref 0–0.2)
BASOPHILS NFR BLD AUTO: 0.6 % — SIGNIFICANT CHANGE UP (ref 0–2)
BILIRUB SERPL-MCNC: 0.2 MG/DL — SIGNIFICANT CHANGE UP (ref 0.2–1.2)
BUN SERPL-MCNC: 17 MG/DL — SIGNIFICANT CHANGE UP (ref 7–23)
CALCIUM SERPL-MCNC: 11.3 MG/DL — HIGH (ref 8.4–10.5)
CHLORIDE SERPL-SCNC: 101 MMOL/L — SIGNIFICANT CHANGE UP (ref 98–107)
CO2 SERPL-SCNC: 29 MMOL/L — SIGNIFICANT CHANGE UP (ref 22–31)
CREAT SERPL-MCNC: 0.67 MG/DL — SIGNIFICANT CHANGE UP (ref 0.5–1.3)
EOSINOPHIL # BLD AUTO: 0.04 K/UL — SIGNIFICANT CHANGE UP (ref 0–0.5)
EOSINOPHIL NFR BLD AUTO: 0.7 % — SIGNIFICANT CHANGE UP (ref 0–6)
GLUCOSE SERPL-MCNC: 66 MG/DL — LOW (ref 70–99)
HCT VFR BLD CALC: 37.3 % — LOW (ref 39–50)
HGB BLD-MCNC: 12 G/DL — LOW (ref 13–17)
IMM GRANULOCYTES # BLD AUTO: 0.04 # — SIGNIFICANT CHANGE UP
IMM GRANULOCYTES NFR BLD AUTO: 0.7 % — SIGNIFICANT CHANGE UP (ref 0–1.5)
LYMPHOCYTES # BLD AUTO: 1.94 K/UL — SIGNIFICANT CHANGE UP (ref 1–3.3)
LYMPHOCYTES # BLD AUTO: 35.9 % — SIGNIFICANT CHANGE UP (ref 13–44)
MAGNESIUM SERPL-MCNC: 2 MG/DL — SIGNIFICANT CHANGE UP (ref 1.6–2.6)
MCHC RBC-ENTMCNC: 30.5 PG — SIGNIFICANT CHANGE UP (ref 27–34)
MCHC RBC-ENTMCNC: 32.2 % — SIGNIFICANT CHANGE UP (ref 32–36)
MCV RBC AUTO: 94.7 FL — SIGNIFICANT CHANGE UP (ref 80–100)
MONOCYTES # BLD AUTO: 0.76 K/UL — SIGNIFICANT CHANGE UP (ref 0–0.9)
MONOCYTES NFR BLD AUTO: 14.1 % — HIGH (ref 2–14)
NEUTROPHILS # BLD AUTO: 2.59 K/UL — SIGNIFICANT CHANGE UP (ref 1.8–7.4)
NEUTROPHILS NFR BLD AUTO: 48 % — SIGNIFICANT CHANGE UP (ref 43–77)
NRBC # FLD: 0 — SIGNIFICANT CHANGE UP
PHOSPHATE SERPL-MCNC: 2.4 MG/DL — LOW (ref 2.5–4.5)
PLATELET # BLD AUTO: 157 K/UL — SIGNIFICANT CHANGE UP (ref 150–400)
PMV BLD: 11 FL — SIGNIFICANT CHANGE UP (ref 7–13)
POTASSIUM SERPL-MCNC: 4.2 MMOL/L — SIGNIFICANT CHANGE UP (ref 3.5–5.3)
POTASSIUM SERPL-SCNC: 4.2 MMOL/L — SIGNIFICANT CHANGE UP (ref 3.5–5.3)
PROT SERPL-MCNC: 7.2 G/DL — SIGNIFICANT CHANGE UP (ref 6–8.3)
RBC # BLD: 3.94 M/UL — LOW (ref 4.2–5.8)
RBC # FLD: 14.3 % — SIGNIFICANT CHANGE UP (ref 10.3–14.5)
SODIUM SERPL-SCNC: 140 MMOL/L — SIGNIFICANT CHANGE UP (ref 135–145)
VALPROATE SERPL-MCNC: 81.1 UG/ML — SIGNIFICANT CHANGE UP (ref 50–100)
WBC # BLD: 5.4 K/UL — SIGNIFICANT CHANGE UP (ref 3.8–10.5)
WBC # FLD AUTO: 5.4 K/UL — SIGNIFICANT CHANGE UP (ref 3.8–10.5)

## 2018-09-20 RX ADMIN — RISPERIDONE 5 MILLIGRAM(S): 4 TABLET ORAL at 21:12

## 2018-09-20 RX ADMIN — Medication 1 MILLIGRAM(S): at 21:12

## 2018-09-20 RX ADMIN — Medication 1500 MILLIGRAM(S): at 21:12

## 2018-09-20 RX ADMIN — CINACALCET 30 MILLIGRAM(S): 30 TABLET, FILM COATED ORAL at 21:12

## 2018-09-20 RX ADMIN — Medication 0.5 MILLIGRAM(S): at 09:26

## 2018-09-21 LAB
24R-OH-CALCIDIOL SERPL-MCNC: 17.2 NG/ML — LOW (ref 30–80)
VIT D25+D1,25 OH+D1,25 PNL SERPL-MCNC: 94.2 PG/ML — HIGH (ref 19.9–79.3)

## 2018-09-21 PROCEDURE — 99232 SBSQ HOSP IP/OBS MODERATE 35: CPT

## 2018-09-21 RX ORDER — RISPERIDONE 4 MG/1
25 TABLET ORAL ONCE
Qty: 0 | Refills: 0 | Status: DISCONTINUED | OUTPATIENT
Start: 2018-09-21 | End: 2018-09-21

## 2018-09-21 RX ORDER — RISPERIDONE 4 MG/1
25 TABLET ORAL ONCE
Qty: 0 | Refills: 0 | Status: COMPLETED | OUTPATIENT
Start: 2018-09-21 | End: 2018-09-21

## 2018-09-21 RX ADMIN — RISPERIDONE 25 MILLIGRAM(S): 4 TABLET ORAL at 17:40

## 2018-09-21 RX ADMIN — Medication 0.5 MILLIGRAM(S): at 09:14

## 2018-09-21 RX ADMIN — Medication 1 MILLIGRAM(S): at 20:53

## 2018-09-21 RX ADMIN — CINACALCET 30 MILLIGRAM(S): 30 TABLET, FILM COATED ORAL at 20:53

## 2018-09-21 RX ADMIN — RISPERIDONE 5 MILLIGRAM(S): 4 TABLET ORAL at 20:53

## 2018-09-21 RX ADMIN — Medication 1500 MILLIGRAM(S): at 20:53

## 2018-09-21 NOTE — CHART NOTE - NSCHARTNOTEFT_GEN_A_CORE
Labs reviewed and case discussed with psychiatrist Dr. Billingsley. Calcium 11.3 stable. Patient intermittently refusing medications and blood draws.  Would continue cinacalcet 30mg BID and cholecalciferol 1000u daily as ordered.  Patient can follow up with endocrinology as outpatient [phone].  Will sign off please call if any further questions.

## 2018-09-21 NOTE — PHARMACOTHERAPY INTERVENTION NOTE - COMMENTS
Spoke with MD regarding 2nd dose of Risperdal Consta 25 mg in 3 days. It was a split dose to make a total dose of Risperdal Consta 50 mg.

## 2018-09-22 RX ADMIN — CINACALCET 30 MILLIGRAM(S): 30 TABLET, FILM COATED ORAL at 21:05

## 2018-09-22 RX ADMIN — Medication 1 MILLIGRAM(S): at 21:05

## 2018-09-22 RX ADMIN — Medication 0.5 MILLIGRAM(S): at 09:52

## 2018-09-22 RX ADMIN — RISPERIDONE 5 MILLIGRAM(S): 4 TABLET ORAL at 21:05

## 2018-09-22 RX ADMIN — Medication 1500 MILLIGRAM(S): at 21:05

## 2018-09-23 RX ADMIN — CINACALCET 30 MILLIGRAM(S): 30 TABLET, FILM COATED ORAL at 21:36

## 2018-09-23 RX ADMIN — Medication 1 MILLIGRAM(S): at 21:36

## 2018-09-23 RX ADMIN — Medication 1500 MILLIGRAM(S): at 21:36

## 2018-09-23 RX ADMIN — Medication 0.5 MILLIGRAM(S): at 09:19

## 2018-09-23 RX ADMIN — RISPERIDONE 5 MILLIGRAM(S): 4 TABLET ORAL at 21:36

## 2018-09-24 VITALS — RESPIRATION RATE: 18 BRPM | TEMPERATURE: 98 F

## 2018-09-24 PROCEDURE — 99238 HOSP IP/OBS DSCHRG MGMT 30/<: CPT

## 2018-09-24 RX ORDER — BENZTROPINE MESYLATE 1 MG
1 TABLET ORAL
Qty: 15 | Refills: 0 | OUTPATIENT
Start: 2018-09-24 | End: 2018-10-08

## 2018-09-24 RX ORDER — RISPERIDONE 4 MG/1
1 TABLET ORAL
Qty: 15 | Refills: 0 | OUTPATIENT
Start: 2018-09-24 | End: 2018-10-08

## 2018-09-24 RX ORDER — VALPROIC ACID (AS SODIUM SALT) 250 MG/5ML
30 SOLUTION, ORAL ORAL
Qty: 450 | Refills: 0 | OUTPATIENT
Start: 2018-09-24 | End: 2018-10-08

## 2018-09-24 RX ORDER — CINACALCET 30 MG/1
1 TABLET, FILM COATED ORAL
Qty: 30 | Refills: 0 | OUTPATIENT
Start: 2018-09-24 | End: 2018-10-08

## 2018-09-24 RX ORDER — CINACALCET 30 MG/1
1 TABLET, FILM COATED ORAL
Qty: 30 | Refills: 0
Start: 2018-09-24 | End: 2018-10-08

## 2018-09-24 RX ORDER — PROPRANOLOL HCL 160 MG
1 CAPSULE, EXTENDED RELEASE 24HR ORAL
Qty: 15 | Refills: 0 | OUTPATIENT
Start: 2018-09-24 | End: 2018-10-08

## 2018-09-24 RX ORDER — CHOLECALCIFEROL (VITAMIN D3) 125 MCG
1000 CAPSULE ORAL
Qty: 15000 | Refills: 0 | OUTPATIENT
Start: 2018-09-24 | End: 2018-10-08

## 2018-09-24 RX ORDER — CINACALCET 30 MG/1
1 TABLET, FILM COATED ORAL
Qty: 30 | Refills: 0 | DISCHARGE
Start: 2018-09-24 | End: 2018-10-08

## 2018-09-24 RX ADMIN — Medication 0.5 MILLIGRAM(S): at 09:02

## 2019-05-30 PROBLEM — F25.9 SCHIZOAFFECTIVE DISORDER, UNSPECIFIED: Chronic | Status: ACTIVE | Noted: 2018-08-03

## 2019-05-30 PROBLEM — I10 ESSENTIAL (PRIMARY) HYPERTENSION: Chronic | Status: ACTIVE | Noted: 2018-08-03

## 2019-05-30 PROBLEM — E21.3 HYPERPARATHYROIDISM, UNSPECIFIED: Chronic | Status: ACTIVE | Noted: 2018-08-03

## 2020-01-01 ENCOUNTER — INPATIENT (INPATIENT)
Facility: HOSPITAL | Age: 80
LOS: 7 days | DRG: 177 | End: 2020-04-15
Attending: HOSPITALIST | Admitting: STUDENT IN AN ORGANIZED HEALTH CARE EDUCATION/TRAINING PROGRAM
Payer: MEDICARE

## 2020-01-01 VITALS
WEIGHT: 179.9 LBS | RESPIRATION RATE: 28 BRPM | DIASTOLIC BLOOD PRESSURE: 68 MMHG | TEMPERATURE: 98 F | OXYGEN SATURATION: 95 % | HEIGHT: 73 IN | SYSTOLIC BLOOD PRESSURE: 105 MMHG | HEART RATE: 109 BPM

## 2020-01-01 VITALS
SYSTOLIC BLOOD PRESSURE: 100 MMHG | DIASTOLIC BLOOD PRESSURE: 61 MMHG | TEMPERATURE: 100 F | OXYGEN SATURATION: 90 % | HEART RATE: 105 BPM | RESPIRATION RATE: 22 BRPM

## 2020-01-01 DIAGNOSIS — F25.9 SCHIZOAFFECTIVE DISORDER, UNSPECIFIED: ICD-10-CM

## 2020-01-01 DIAGNOSIS — R68.89 OTHER GENERAL SYMPTOMS AND SIGNS: ICD-10-CM

## 2020-01-01 DIAGNOSIS — R41.0 DISORIENTATION, UNSPECIFIED: ICD-10-CM

## 2020-01-01 LAB
ALBUMIN SERPL ELPH-MCNC: 2.6 G/DL — LOW (ref 3.3–5.2)
ALP SERPL-CCNC: 46 U/L — SIGNIFICANT CHANGE UP (ref 40–120)
ALT FLD-CCNC: 42 U/L — HIGH
ANION GAP SERPL CALC-SCNC: 10 MMOL/L — SIGNIFICANT CHANGE UP (ref 5–17)
ANION GAP SERPL CALC-SCNC: 14 MMOL/L — SIGNIFICANT CHANGE UP (ref 5–17)
ANISOCYTOSIS BLD QL: SIGNIFICANT CHANGE UP
APTT BLD: 28.3 SEC — SIGNIFICANT CHANGE UP (ref 27.5–36.3)
AST SERPL-CCNC: 83 U/L — HIGH
BASE EXCESS BLDA CALC-SCNC: 8 MMOL/L — HIGH (ref -2–2)
BASOPHILS # BLD AUTO: 0.08 K/UL — SIGNIFICANT CHANGE UP (ref 0–0.2)
BASOPHILS NFR BLD AUTO: 0.9 % — SIGNIFICANT CHANGE UP (ref 0–2)
BILIRUB SERPL-MCNC: 0.3 MG/DL — LOW (ref 0.4–2)
BUN SERPL-MCNC: 17 MG/DL — SIGNIFICANT CHANGE UP (ref 8–20)
BUN SERPL-MCNC: 37 MG/DL — HIGH (ref 8–20)
BURR CELLS BLD QL SMEAR: PRESENT — SIGNIFICANT CHANGE UP
CALCIUM SERPL-MCNC: 9.1 MG/DL — SIGNIFICANT CHANGE UP (ref 8.6–10.2)
CALCIUM SERPL-MCNC: 9.8 MG/DL — SIGNIFICANT CHANGE UP (ref 8.6–10.2)
CHLORIDE SERPL-SCNC: 100 MMOL/L — SIGNIFICANT CHANGE UP (ref 98–107)
CHLORIDE SERPL-SCNC: 108 MMOL/L — HIGH (ref 98–107)
CO2 SERPL-SCNC: 27 MMOL/L — SIGNIFICANT CHANGE UP (ref 22–29)
CO2 SERPL-SCNC: 29 MMOL/L — SIGNIFICANT CHANGE UP (ref 22–29)
CREAT SERPL-MCNC: 0.48 MG/DL — LOW (ref 0.5–1.3)
CREAT SERPL-MCNC: 0.81 MG/DL — SIGNIFICANT CHANGE UP (ref 0.5–1.3)
CRP SERPL-MCNC: 12.38 MG/DL — HIGH (ref 0–0.4)
CRP SERPL-MCNC: 26.63 MG/DL — HIGH (ref 0–0.4)
CRP SERPL-MCNC: 6.71 MG/DL — HIGH (ref 0–0.4)
D DIMER BLD IA.RAPID-MCNC: HIGH NG/ML DDU
ELLIPTOCYTES BLD QL SMEAR: SIGNIFICANT CHANGE UP
EOSINOPHIL # BLD AUTO: 0.08 K/UL — SIGNIFICANT CHANGE UP (ref 0–0.5)
EOSINOPHIL NFR BLD AUTO: 0.9 % — SIGNIFICANT CHANGE UP (ref 0–6)
FERRITIN SERPL-MCNC: 286 NG/ML — SIGNIFICANT CHANGE UP (ref 30–400)
FERRITIN SERPL-MCNC: 458 NG/ML — HIGH (ref 30–400)
FIBRINOGEN PPP-MCNC: 428 MG/DL — SIGNIFICANT CHANGE UP (ref 300–520)
GAS PNL BLDA: SIGNIFICANT CHANGE UP
GIANT PLATELETS BLD QL SMEAR: PRESENT — SIGNIFICANT CHANGE UP
GLUCOSE SERPL-MCNC: 122 MG/DL — HIGH (ref 70–99)
GLUCOSE SERPL-MCNC: 164 MG/DL — HIGH (ref 70–99)
HCO3 BLDA-SCNC: 32 MMOL/L — HIGH (ref 20–26)
HCT VFR BLD CALC: 32.5 % — LOW (ref 39–50)
HCT VFR BLD CALC: 37.2 % — LOW (ref 39–50)
HGB BLD-MCNC: 10.4 G/DL — LOW (ref 13–17)
HGB BLD-MCNC: 11.6 G/DL — LOW (ref 13–17)
HOROWITZ INDEX BLDA+IHG-RTO: 100 — SIGNIFICANT CHANGE UP
HYPOCHROMIA BLD QL: SLIGHT — SIGNIFICANT CHANGE UP
INR BLD: 1.38 RATIO — HIGH (ref 0.88–1.16)
LDH SERPL L TO P-CCNC: 828 U/L — HIGH (ref 98–192)
LYMPHOCYTES # BLD AUTO: 0.73 K/UL — LOW (ref 1–3.3)
LYMPHOCYTES # BLD AUTO: 7.8 % — LOW (ref 13–44)
MACROCYTES BLD QL: SIGNIFICANT CHANGE UP
MCHC RBC-ENTMCNC: 29.4 PG — SIGNIFICANT CHANGE UP (ref 27–34)
MCHC RBC-ENTMCNC: 30.1 PG — SIGNIFICANT CHANGE UP (ref 27–34)
MCHC RBC-ENTMCNC: 31.2 GM/DL — LOW (ref 32–36)
MCHC RBC-ENTMCNC: 32 GM/DL — SIGNIFICANT CHANGE UP (ref 32–36)
MCV RBC AUTO: 94.2 FL — SIGNIFICANT CHANGE UP (ref 80–100)
MCV RBC AUTO: 94.4 FL — SIGNIFICANT CHANGE UP (ref 80–100)
MICROCYTES BLD QL: SLIGHT — SIGNIFICANT CHANGE UP
MONOCYTES # BLD AUTO: 1.3 K/UL — HIGH (ref 0–0.9)
MONOCYTES NFR BLD AUTO: 13.9 % — SIGNIFICANT CHANGE UP (ref 2–14)
NEUTROPHILS # BLD AUTO: 7.15 K/UL — SIGNIFICANT CHANGE UP (ref 1.8–7.4)
NEUTROPHILS NFR BLD AUTO: 75.6 % — SIGNIFICANT CHANGE UP (ref 43–77)
NEUTS BAND # BLD: 0.9 % — SIGNIFICANT CHANGE UP (ref 0–8)
NRBC # BLD: 1 /100 — HIGH (ref 0–0)
PCO2 BLDA: 47 MMHG — HIGH (ref 35–45)
PH BLDA: 7.46 — HIGH (ref 7.35–7.45)
PLAT MORPH BLD: NORMAL — SIGNIFICANT CHANGE UP
PLATELET # BLD AUTO: 184 K/UL — SIGNIFICANT CHANGE UP (ref 150–400)
PLATELET # BLD AUTO: 215 K/UL — SIGNIFICANT CHANGE UP (ref 150–400)
PO2 BLDA: 59 MMHG — LOW (ref 83–108)
POIKILOCYTOSIS BLD QL AUTO: SIGNIFICANT CHANGE UP
POLYCHROMASIA BLD QL SMEAR: SLIGHT — SIGNIFICANT CHANGE UP
POTASSIUM SERPL-MCNC: 4.3 MMOL/L — SIGNIFICANT CHANGE UP (ref 3.5–5.3)
POTASSIUM SERPL-MCNC: 4.6 MMOL/L — SIGNIFICANT CHANGE UP (ref 3.5–5.3)
POTASSIUM SERPL-SCNC: 4.3 MMOL/L — SIGNIFICANT CHANGE UP (ref 3.5–5.3)
POTASSIUM SERPL-SCNC: 4.6 MMOL/L — SIGNIFICANT CHANGE UP (ref 3.5–5.3)
PROCALCITONIN SERPL-MCNC: 0.12 NG/ML — HIGH (ref 0.02–0.1)
PROCALCITONIN SERPL-MCNC: 0.2 NG/ML — HIGH (ref 0.02–0.1)
PROT SERPL-MCNC: 6.5 G/DL — LOW (ref 6.6–8.7)
PROTHROM AB SERPL-ACNC: 15.8 SEC — HIGH (ref 10–12.9)
RBC # BLD: 3.45 M/UL — LOW (ref 4.2–5.8)
RBC # BLD: 3.94 M/UL — LOW (ref 4.2–5.8)
RBC # FLD: 15.3 % — HIGH (ref 10.3–14.5)
RBC # FLD: 15.9 % — HIGH (ref 10.3–14.5)
RBC BLD AUTO: ABNORMAL
SAO2 % BLDA: 89 % — LOW (ref 95–99)
SARS-COV-2 RNA SPEC QL NAA+PROBE: DETECTED
SARS-COV-2 RNA SPEC QL NAA+PROBE: SIGNIFICANT CHANGE UP
SODIUM SERPL-SCNC: 141 MMOL/L — SIGNIFICANT CHANGE UP (ref 135–145)
SODIUM SERPL-SCNC: 147 MMOL/L — HIGH (ref 135–145)
TROPONIN T SERPL-MCNC: <0.01 NG/ML — SIGNIFICANT CHANGE UP (ref 0–0.06)
WBC # BLD: 10.61 K/UL — HIGH (ref 3.8–10.5)
WBC # BLD: 9.35 K/UL — SIGNIFICANT CHANGE UP (ref 3.8–10.5)
WBC # FLD AUTO: 10.61 K/UL — HIGH (ref 3.8–10.5)
WBC # FLD AUTO: 9.35 K/UL — SIGNIFICANT CHANGE UP (ref 3.8–10.5)

## 2020-01-01 PROCEDURE — 80048 BASIC METABOLIC PNL TOTAL CA: CPT

## 2020-01-01 PROCEDURE — 80053 COMPREHEN METABOLIC PANEL: CPT

## 2020-01-01 PROCEDURE — 85730 THROMBOPLASTIN TIME PARTIAL: CPT

## 2020-01-01 PROCEDURE — 71275 CT ANGIOGRAPHY CHEST: CPT

## 2020-01-01 PROCEDURE — 71045 X-RAY EXAM CHEST 1 VIEW: CPT

## 2020-01-01 PROCEDURE — 36415 COLL VENOUS BLD VENIPUNCTURE: CPT

## 2020-01-01 PROCEDURE — 99232 SBSQ HOSP IP/OBS MODERATE 35: CPT | Mod: CS

## 2020-01-01 PROCEDURE — 99232 SBSQ HOSP IP/OBS MODERATE 35: CPT

## 2020-01-01 PROCEDURE — 71275 CT ANGIOGRAPHY CHEST: CPT | Mod: 26

## 2020-01-01 PROCEDURE — 93010 ELECTROCARDIOGRAM REPORT: CPT

## 2020-01-01 PROCEDURE — 99285 EMERGENCY DEPT VISIT HI MDM: CPT

## 2020-01-01 PROCEDURE — 82803 BLOOD GASES ANY COMBINATION: CPT

## 2020-01-01 PROCEDURE — 99222 1ST HOSP IP/OBS MODERATE 55: CPT

## 2020-01-01 PROCEDURE — 86140 C-REACTIVE PROTEIN: CPT

## 2020-01-01 PROCEDURE — 87635 SARS-COV-2 COVID-19 AMP PRB: CPT

## 2020-01-01 PROCEDURE — 83615 LACTATE (LD) (LDH) ENZYME: CPT

## 2020-01-01 PROCEDURE — 99233 SBSQ HOSP IP/OBS HIGH 50: CPT

## 2020-01-01 PROCEDURE — 85384 FIBRINOGEN ACTIVITY: CPT

## 2020-01-01 PROCEDURE — 85027 COMPLETE CBC AUTOMATED: CPT

## 2020-01-01 PROCEDURE — 82728 ASSAY OF FERRITIN: CPT

## 2020-01-01 PROCEDURE — 71045 X-RAY EXAM CHEST 1 VIEW: CPT | Mod: 26

## 2020-01-01 PROCEDURE — 85610 PROTHROMBIN TIME: CPT

## 2020-01-01 PROCEDURE — 12345: CPT | Mod: NC

## 2020-01-01 PROCEDURE — 93005 ELECTROCARDIOGRAM TRACING: CPT

## 2020-01-01 PROCEDURE — 85379 FIBRIN DEGRADATION QUANT: CPT

## 2020-01-01 PROCEDURE — 99223 1ST HOSP IP/OBS HIGH 75: CPT

## 2020-01-01 PROCEDURE — 84484 ASSAY OF TROPONIN QUANT: CPT

## 2020-01-01 PROCEDURE — 84145 PROCALCITONIN (PCT): CPT

## 2020-01-01 RX ORDER — VALPROIC ACID (AS SODIUM SALT) 250 MG/5ML
750 SOLUTION, ORAL ORAL EVERY 12 HOURS
Refills: 0 | Status: DISCONTINUED | OUTPATIENT
Start: 2020-01-01 | End: 2020-01-01

## 2020-01-01 RX ORDER — MORPHINE SULFATE 50 MG/1
2 CAPSULE, EXTENDED RELEASE ORAL
Refills: 0 | Status: DISCONTINUED | OUTPATIENT
Start: 2020-01-01 | End: 2020-01-01

## 2020-01-01 RX ORDER — MORPHINE SULFATE 50 MG/1
2 CAPSULE, EXTENDED RELEASE ORAL EVERY 6 HOURS
Refills: 0 | Status: DISCONTINUED | OUTPATIENT
Start: 2020-01-01 | End: 2020-01-01

## 2020-01-01 RX ORDER — AZITHROMYCIN 500 MG/1
500 TABLET, FILM COATED ORAL EVERY 24 HOURS
Refills: 0 | Status: DISCONTINUED | OUTPATIENT
Start: 2020-01-01 | End: 2020-01-01

## 2020-01-01 RX ORDER — SODIUM CHLORIDE 9 MG/ML
500 INJECTION INTRAMUSCULAR; INTRAVENOUS; SUBCUTANEOUS ONCE
Refills: 0 | Status: COMPLETED | OUTPATIENT
Start: 2020-01-01 | End: 2020-01-01

## 2020-01-01 RX ORDER — CEFTRIAXONE 500 MG/1
1000 INJECTION, POWDER, FOR SOLUTION INTRAMUSCULAR; INTRAVENOUS EVERY 24 HOURS
Refills: 0 | Status: DISCONTINUED | OUTPATIENT
Start: 2020-01-01 | End: 2020-01-01

## 2020-01-01 RX ORDER — HALOPERIDOL DECANOATE 100 MG/ML
5 INJECTION INTRAMUSCULAR
Refills: 0 | Status: DISCONTINUED | OUTPATIENT
Start: 2020-01-01 | End: 2020-01-01

## 2020-01-01 RX ORDER — HALOPERIDOL DECANOATE 100 MG/ML
5 INJECTION INTRAMUSCULAR
Qty: 0 | Refills: 0 | DISCHARGE

## 2020-01-01 RX ORDER — AZITHROMYCIN 500 MG/1
500 TABLET, FILM COATED ORAL ONCE
Refills: 0 | Status: COMPLETED | OUTPATIENT
Start: 2020-01-01 | End: 2020-01-01

## 2020-01-01 RX ORDER — ENOXAPARIN SODIUM 100 MG/ML
40 INJECTION SUBCUTANEOUS DAILY
Refills: 0 | Status: DISCONTINUED | OUTPATIENT
Start: 2020-01-01 | End: 2020-01-01

## 2020-01-01 RX ORDER — MORPHINE SULFATE 50 MG/1
2 CAPSULE, EXTENDED RELEASE ORAL ONCE
Refills: 0 | Status: DISCONTINUED | OUTPATIENT
Start: 2020-01-01 | End: 2020-01-01

## 2020-01-01 RX ORDER — SODIUM CHLORIDE 9 MG/ML
1000 INJECTION INTRAMUSCULAR; INTRAVENOUS; SUBCUTANEOUS
Refills: 0 | Status: DISCONTINUED | OUTPATIENT
Start: 2020-01-01 | End: 2020-01-01

## 2020-01-01 RX ORDER — ACETAMINOPHEN 500 MG
650 TABLET ORAL EVERY 4 HOURS
Refills: 0 | Status: DISCONTINUED | OUTPATIENT
Start: 2020-01-01 | End: 2020-01-01

## 2020-01-01 RX ORDER — HALOPERIDOL DECANOATE 100 MG/ML
7.5 INJECTION INTRAMUSCULAR
Qty: 0 | Refills: 0 | DISCHARGE

## 2020-01-01 RX ORDER — METOPROLOL TARTRATE 50 MG
2.5 TABLET ORAL ONCE
Refills: 0 | Status: COMPLETED | OUTPATIENT
Start: 2020-01-01 | End: 2020-01-01

## 2020-01-01 RX ORDER — ENOXAPARIN SODIUM 100 MG/ML
80 INJECTION SUBCUTANEOUS EVERY 12 HOURS
Refills: 0 | Status: DISCONTINUED | OUTPATIENT
Start: 2020-01-01 | End: 2020-01-01

## 2020-01-01 RX ORDER — ROBINUL 0.2 MG/ML
0.2 INJECTION INTRAMUSCULAR; INTRAVENOUS EVERY 4 HOURS
Refills: 0 | Status: DISCONTINUED | OUTPATIENT
Start: 2020-01-01 | End: 2020-01-01

## 2020-01-01 RX ORDER — HYDROXYCHLOROQUINE SULFATE 200 MG
TABLET ORAL
Refills: 0 | Status: COMPLETED | OUTPATIENT
Start: 2020-01-01 | End: 2020-01-01

## 2020-01-01 RX ORDER — HYDROXYCHLOROQUINE SULFATE 200 MG
400 TABLET ORAL EVERY 12 HOURS
Refills: 0 | Status: COMPLETED | OUTPATIENT
Start: 2020-01-01 | End: 2020-01-01

## 2020-01-01 RX ORDER — SODIUM CHLORIDE 9 MG/ML
1000 INJECTION, SOLUTION INTRAVENOUS
Refills: 0 | Status: DISCONTINUED | OUTPATIENT
Start: 2020-01-01 | End: 2020-01-01

## 2020-01-01 RX ORDER — MORPHINE SULFATE 50 MG/1
2 CAPSULE, EXTENDED RELEASE ORAL
Qty: 100 | Refills: 0 | Status: DISCONTINUED | OUTPATIENT
Start: 2020-01-01 | End: 2020-01-01

## 2020-01-01 RX ORDER — HYDROXYCHLOROQUINE SULFATE 200 MG
200 TABLET ORAL EVERY 12 HOURS
Refills: 0 | Status: COMPLETED | OUTPATIENT
Start: 2020-01-01 | End: 2020-01-01

## 2020-01-01 RX ADMIN — ENOXAPARIN SODIUM 40 MILLIGRAM(S): 100 INJECTION SUBCUTANEOUS at 12:49

## 2020-01-01 RX ADMIN — Medication 400 MILLIGRAM(S): at 12:22

## 2020-01-01 RX ADMIN — Medication 750 MILLIGRAM(S): at 05:43

## 2020-01-01 RX ADMIN — Medication 80 MILLIGRAM(S): at 16:34

## 2020-01-01 RX ADMIN — ENOXAPARIN SODIUM 40 MILLIGRAM(S): 100 INJECTION SUBCUTANEOUS at 10:37

## 2020-01-01 RX ADMIN — Medication 750 MILLIGRAM(S): at 04:25

## 2020-01-01 RX ADMIN — Medication 200 MILLIGRAM(S): at 16:49

## 2020-01-01 RX ADMIN — ENOXAPARIN SODIUM 80 MILLIGRAM(S): 100 INJECTION SUBCUTANEOUS at 13:01

## 2020-01-01 RX ADMIN — Medication 200 MILLIGRAM(S): at 20:24

## 2020-01-01 RX ADMIN — Medication 2 MILLIGRAM(S): at 23:18

## 2020-01-01 RX ADMIN — Medication 80 MILLIGRAM(S): at 12:49

## 2020-01-01 RX ADMIN — CEFTRIAXONE 100 MILLIGRAM(S): 500 INJECTION, POWDER, FOR SOLUTION INTRAMUSCULAR; INTRAVENOUS at 08:13

## 2020-01-01 RX ADMIN — Medication 2 MILLIGRAM(S): at 08:44

## 2020-01-01 RX ADMIN — Medication 2.5 MILLIGRAM(S): at 11:31

## 2020-01-01 RX ADMIN — ENOXAPARIN SODIUM 80 MILLIGRAM(S): 100 INJECTION SUBCUTANEOUS at 05:43

## 2020-01-01 RX ADMIN — SODIUM CHLORIDE 100 MILLILITER(S): 9 INJECTION INTRAMUSCULAR; INTRAVENOUS; SUBCUTANEOUS at 10:38

## 2020-01-01 RX ADMIN — ENOXAPARIN SODIUM 80 MILLIGRAM(S): 100 INJECTION SUBCUTANEOUS at 17:48

## 2020-01-01 RX ADMIN — Medication 2 MILLIGRAM(S): at 22:20

## 2020-01-01 RX ADMIN — Medication 400 MILLIGRAM(S): at 21:12

## 2020-01-01 RX ADMIN — AZITHROMYCIN 255 MILLIGRAM(S): 500 TABLET, FILM COATED ORAL at 02:49

## 2020-01-01 RX ADMIN — CEFTRIAXONE 100 MILLIGRAM(S): 500 INJECTION, POWDER, FOR SOLUTION INTRAMUSCULAR; INTRAVENOUS at 16:49

## 2020-01-01 RX ADMIN — Medication 28.75 MILLIGRAM(S): at 04:57

## 2020-01-01 RX ADMIN — Medication 750 MILLIGRAM(S): at 16:50

## 2020-01-01 RX ADMIN — SODIUM CHLORIDE 100 MILLILITER(S): 9 INJECTION INTRAMUSCULAR; INTRAVENOUS; SUBCUTANEOUS at 21:13

## 2020-01-01 RX ADMIN — Medication 80 MILLIGRAM(S): at 12:32

## 2020-01-01 RX ADMIN — HALOPERIDOL DECANOATE 5 MILLIGRAM(S): 100 INJECTION INTRAMUSCULAR at 16:55

## 2020-01-01 RX ADMIN — Medication 750 MILLIGRAM(S): at 16:55

## 2020-01-01 RX ADMIN — ENOXAPARIN SODIUM 40 MILLIGRAM(S): 100 INJECTION SUBCUTANEOUS at 12:56

## 2020-01-01 RX ADMIN — Medication 2 MILLIGRAM(S): at 15:55

## 2020-01-01 RX ADMIN — MORPHINE SULFATE 2 MG/HR: 50 CAPSULE, EXTENDED RELEASE ORAL at 22:51

## 2020-01-01 RX ADMIN — Medication 2 MILLIGRAM(S): at 16:55

## 2020-01-01 RX ADMIN — SODIUM CHLORIDE 500 MILLILITER(S): 9 INJECTION INTRAMUSCULAR; INTRAVENOUS; SUBCUTANEOUS at 11:12

## 2020-01-01 RX ADMIN — MORPHINE SULFATE 2 MILLIGRAM(S): 50 CAPSULE, EXTENDED RELEASE ORAL at 06:50

## 2020-01-01 RX ADMIN — Medication 28.75 MILLIGRAM(S): at 18:43

## 2020-01-01 RX ADMIN — Medication 200 MILLIGRAM(S): at 09:00

## 2020-01-01 RX ADMIN — Medication 750 MILLIGRAM(S): at 16:45

## 2020-01-01 RX ADMIN — Medication 750 MILLIGRAM(S): at 19:00

## 2020-01-01 RX ADMIN — Medication 200 MILLIGRAM(S): at 19:56

## 2020-01-01 RX ADMIN — Medication 100 MILLIGRAM(S): at 16:49

## 2020-01-01 RX ADMIN — Medication 28.75 MILLIGRAM(S): at 18:26

## 2020-01-01 RX ADMIN — HALOPERIDOL DECANOATE 5 MILLIGRAM(S): 100 INJECTION INTRAMUSCULAR at 05:10

## 2020-01-01 RX ADMIN — HALOPERIDOL DECANOATE 5 MILLIGRAM(S): 100 INJECTION INTRAMUSCULAR at 05:43

## 2020-01-01 RX ADMIN — ENOXAPARIN SODIUM 80 MILLIGRAM(S): 100 INJECTION SUBCUTANEOUS at 05:10

## 2020-01-01 RX ADMIN — Medication 750 MILLIGRAM(S): at 05:03

## 2020-01-01 RX ADMIN — SODIUM CHLORIDE 100 MILLILITER(S): 9 INJECTION INTRAMUSCULAR; INTRAVENOUS; SUBCUTANEOUS at 11:32

## 2020-01-01 RX ADMIN — MORPHINE SULFATE 2 MILLIGRAM(S): 50 CAPSULE, EXTENDED RELEASE ORAL at 12:19

## 2020-01-01 RX ADMIN — Medication 750 MILLIGRAM(S): at 05:11

## 2020-01-01 RX ADMIN — HALOPERIDOL DECANOATE 5 MILLIGRAM(S): 100 INJECTION INTRAMUSCULAR at 23:15

## 2020-01-01 RX ADMIN — ENOXAPARIN SODIUM 80 MILLIGRAM(S): 100 INJECTION SUBCUTANEOUS at 16:55

## 2020-01-01 RX ADMIN — Medication 750 MILLIGRAM(S): at 17:48

## 2020-01-01 RX ADMIN — Medication 100 MILLIGRAM(S): at 04:25

## 2020-01-01 RX ADMIN — Medication 2 MILLIGRAM(S): at 05:35

## 2020-01-01 RX ADMIN — MORPHINE SULFATE 2 MILLIGRAM(S): 50 CAPSULE, EXTENDED RELEASE ORAL at 08:51

## 2020-01-01 RX ADMIN — Medication 80 MILLIGRAM(S): at 11:51

## 2020-01-01 RX ADMIN — Medication 28.75 MILLIGRAM(S): at 05:33

## 2020-01-01 RX ADMIN — Medication 2 MILLIGRAM(S): at 04:57

## 2020-01-01 RX ADMIN — SODIUM CHLORIDE 85 MILLILITER(S): 9 INJECTION, SOLUTION INTRAVENOUS at 06:11

## 2020-01-01 RX ADMIN — Medication 2 MILLIGRAM(S): at 15:16

## 2020-01-01 RX ADMIN — MORPHINE SULFATE 2 MILLIGRAM(S): 50 CAPSULE, EXTENDED RELEASE ORAL at 19:51

## 2020-01-01 RX ADMIN — Medication 200 MILLIGRAM(S): at 08:51

## 2020-01-01 RX ADMIN — ENOXAPARIN SODIUM 80 MILLIGRAM(S): 100 INJECTION SUBCUTANEOUS at 05:03

## 2020-01-01 RX ADMIN — Medication 2 MILLIGRAM(S): at 22:23

## 2020-01-01 RX ADMIN — SODIUM CHLORIDE 50 MILLILITER(S): 9 INJECTION INTRAMUSCULAR; INTRAVENOUS; SUBCUTANEOUS at 16:50

## 2020-01-01 RX ADMIN — MORPHINE SULFATE 2 MG/HR: 50 CAPSULE, EXTENDED RELEASE ORAL at 13:35

## 2020-01-01 RX ADMIN — Medication 200 MILLIGRAM(S): at 09:33

## 2020-01-01 RX ADMIN — Medication 200 MILLIGRAM(S): at 22:11

## 2020-04-07 NOTE — ED ADULT TRIAGE NOTE - CHIEF COMPLAINT QUOTE
Pt comes from [place], as per EMS room air O2 sat 60%, O2 sat 92% on 15L NRB, COVID + contacts at [place] Pt comes from [place], as per EMS room air O2 sat 60%, O2 sat 92% on 15L NRB, COVID + contacts at [place], at baseline mental status as per EMS

## 2020-04-08 NOTE — H&P ADULT - ATTENDING COMMENTS
81 y/o male with PMH of HTN, thrombocytopenia, schizoaffective disorder, hyponatremia BIBEMS c/o cough and fever x 3-4 days, associated w/ SOB.   As per [place] staff at bed side, patient does not communicate much at baseline; he is exposed to other patient with known coronavirus.   On evaluation, patient mumbles but not following command.   CoVID-19 PCR sent  Patient pending swallow evaluation, start Plaquenil after evaluation   Rest of the plan as above

## 2020-04-08 NOTE — ED ADULT NURSE REASSESSMENT NOTE - NS ED NURSE REASSESS COMMENT FT1
pt. attempted to give medications. pt. unable to follow commands. pt. de-sating on non-rebreather. pt. tried to put him on his belly, only able to go on his side. MD. Bolanos called to make aware. MD. will be down to evaluate pt.

## 2020-04-08 NOTE — BEHAVIORAL HEALTH ASSESSMENT NOTE - OTHER PAST PSYCHIATRIC HISTORY (INCLUDE DETAILS REGARDING ONSET, COURSE OF ILLNESS, INPATIENT/OUTPATIENT TREATMENT)
first admission to [facility] facility 2006  dx schizoaffective disorder bipolar type

## 2020-04-08 NOTE — ED ADULT NURSE REASSESSMENT NOTE - NS ED NURSE REASSESS COMMENT FT1
MD. Bolanos called to ask about fluids. maintain NS @ 50CC/hr. MD. advised pt. was able to take medications but may need speech and swallow for food intake. as per MD. will see how he does tomorrow for diet order.

## 2020-04-08 NOTE — BEHAVIORAL HEALTH ASSESSMENT NOTE - NSBHCHARTREVIEWLAB_PSY_A_CORE FT
10.4   9.35  )-----------( 184      ( 07 Apr 2020 21:06 )             32.5     04-07    141  |  100  |  37.0<H>  ----------------------------<  164<H>  4.6   |  27.0  |  0.81    Ca    9.1      07 Apr 2020 21:06    TPro  6.5<L>  /  Alb  2.6<L>  /  TBili  0.3<L>  /  DBili  x   /  AST  83<H>  /  ALT  42<H>  /  AlkPhos  46  04-07    LIVER FUNCTIONS - ( 07 Apr 2020 21:06 )  Alb: 2.6 g/dL / Pro: 6.5 g/dL / ALK PHOS: 46 U/L / ALT: 42 U/L / AST: 83 U/L / GGT: x           PT/INR - ( 07 Apr 2020 21:06 )   PT: 15.8 sec;   INR: 1.38 ratio         PTT - ( 07 Apr 2020 21:06 )  PTT:28.3 sec

## 2020-04-08 NOTE — ED PROVIDER NOTE - PMH
Hyperparathyroidism    Hypertension    Hyponatremia    Poor historian    Schizo affective schizophrenia  bipolar type  Schizoaffective disorder    Thrombocytopenia

## 2020-04-08 NOTE — BEHAVIORAL HEALTH ASSESSMENT NOTE - NSBHCONSULTMEDS_PSY_A_CORE FT
valproic  acid Syrup 750 milliGRAM(s) Oral every 12 hours - mood stabilization ( no seizure history)  Haldol concentrate 5 mg am and 7.5 mg hs

## 2020-04-08 NOTE — ED PROVIDER NOTE - NS ED ROS FT
Constitutional: (+) fever  (+)chills  (-)sweats  Eyes/ENT: (-) blurry vision, (-) epistaxis  (-)rhinorrhea   (-) sore throat    Cardiovascular: (-) chest pain, (-) palpitations (-) edema   Respiratory: (+) cough, +shortness of breath   Gastrointestinal: (-)nausea  (-)vomiting, (-) diarrhea  (-) abdominal pain   :  (-)dysuria, (-)frequency, (-)urgency, (-)hematuria  Musculoskeletal: (-) neck pain, (-) back pain, (-) joint pain  Integumentary: (-) rash, (-) edema  Neurological: (-) headache, (-) altered mental status  (-)LOC

## 2020-04-08 NOTE — CONSULT NOTE ADULT - ASSESSMENT
Recommendation:     Consistent with the ethical principle of beneficence, the clinical team must critically assess the efficacy of the therapies on a case by case basis to ascertain if a procedure will cause more harm than benefit the clinical team has a duty to appropriately offer measures that promote comfort.   The team is commended for being great stewards of scarce resources during this time of pandemic COVID 19. His comorbidities present a challenge to his improvement from COVID19 related illness. However, at present his current MSOFA scores indicate appropriate medical interventions are occurring at this time. All patients, such as Mr. Rodríguez should have their advanced directives addressed. As a result of this consult should he deteriorate further, a DNI is suggested to be obtained.   The ethical analysis above affirms the clinical team’s decision to offer continued medical management. In light of comorbidities and current infection DNR is appropriate.  The patient will be assessed daily. At present he does not have an irreversible or incurable condition; AND the provision of treatment would involve such pain, suffering or other burden that it would reasonably be deemed inhumane or extraordinarily burdensome under the circumstances. There is no medical indication for intubation to suggest its’ withdrawal.   Thank you for this challenging case.      Ethics Service will remain available for further conversation about the patient’s current condition and decision-points that may occur.       CONSULT Performed by Tia Fall MD, ILEANA-C  CASE DISCUSSED with Dr. Leonel Duncan Chair of Ethics and co-written WITH EVELYN NICE DNP, AND DIRECTOR OF MEDICAL ETHICS     More than 50% of the time of this consultation was spent in coordination of Care of Patient.	    REFERENCES     Gt RIVERA, Shell BOSTON. A Framework for Rationing Ventilators and Critical Care Beds During the COVID-19 Pandemic. DANIEL. Published online March 27, 2020. doi:10.1001/daniel.2020.5046.  <https://jamanetwork.com/journals/daniel/fullarticle/1264640>    Fatuma MARQUEZ. The Family Health Care Decisions Act.  [facility] Health Law Journal,2010;15:32-35.

## 2020-04-08 NOTE — ED ADULT NURSE REASSESSMENT NOTE - NS ED NURSE REASSESS COMMENT FT1
pt. received from night RN. pt. a&ox2, as per aide from [place] pt. states it his is baseline. pt. de-sating on non rebreather, non rebreather changed and o2 is maintaining at 98%. pt. in no apparent distress at this time.

## 2020-04-08 NOTE — BEHAVIORAL HEALTH ASSESSMENT NOTE - NSBHCHARTREVIEWIMAGING_PSY_A_CORE FT
< from: Xray Chest 1 View AP/PA. (04.07.20 @ 21:04) >    COMPARISON: Chest x-ray 8/3/2018    FINDINGS: Cardiac silhouette is within normal limits of size. Diffuse bilateral airspace opacities. Costophrenic angles are sharp. The osseous structures are grossly unremarkable.    IMPRESSION:     Diffuse bilateral airspace opacities may be infectious versus edema.    < end of copied text >

## 2020-04-08 NOTE — H&P ADULT - HISTORY OF PRESENT ILLNESS
ED HPI: 80yoM; pmh of HTN, thrombocytopenia, schizoaffective disorder, hyponatremia; BIBEMS c/o cough and fever x 3-4 days, associated w/ SOB which has been progressively worsening upon arrival. Pt resides at [place], and had a SpO2 of 60%. Patient placed on NRB and is currently satting at 92%. Positive sick contact at [place] with covid. pmh: HTN, thrombocytopenia, schizoaffective disorder, hyponatremia  SOCIAL: No tobacco/illicit substance use/socialEtOH    Above ED HPI reviewed, on my evaluation patient is lethargic, non communicative, opens his eyes to verbal stimuli.

## 2020-04-08 NOTE — H&P ADULT - NSHPPHYSICALEXAM_GEN_ALL_CORE
Vital Signs Last 24 Hrs  T(C): 36.5 (08 Apr 2020 01:41), Max: 36.5 (08 Apr 2020 01:41)  T(F): 97.7 (08 Apr 2020 01:41), Max: 97.7 (08 Apr 2020 01:41)  HR: 88 (08 Apr 2020 02:38) (81 - 109)  BP: 91/50 (08 Apr 2020 02:38) (91/50 - 105/68)  BP(mean): --  RR: 22 (08 Apr 2020 02:38) (22 - 28)  SpO2: 100% (08 Apr 2020 02:38) (85% - 100%)    Physical appearance: lethargic, opens eyes to verbal stimuli, no respiratory distress noted, breathing comfortable on NRB with Sat 100%. [place] aide at bedside who does not know to patient prior to today and is unable to provide information on patient baseline mentation .  ED Physical examination:   Head:     NC/AT, EOMI, oral mucosa moist  Neck:     trachea midline  Lungs:   bibasilar crackles  CVS:     S1S2, RRR, no m/g/r  Abd:     +BS, s/nt/nd, no organomegaly  Ext:    2+ radial and pedal pulses, no c/c/e

## 2020-04-08 NOTE — CHART NOTE - NSCHARTNOTEFT_GEN_A_CORE
patient seen and examined, please refer to h n p done this am   patient was desaturating on NRM , added NC 10 along with it , also refusing to keep mask on , mittens ordered for safety .  patient does have a sitter   patient has dysphagia , unable to be seen by speech , NGT ordered .plaquenil and steroids started   blood pressure is softer started fluids   does not need intubation now however remains at high risk , but carries guarded prognosis due to advanced age and limited mentation from baseline psych issues   palliative care team and  has been involved.  called cousin Vera Chacko at [phone], she stated that she is not HCP or decision maker   attorneys will be reached for decision making , but meanwhile by default remains full code   patient carries guarded prognosis

## 2020-04-08 NOTE — H&P ADULT - ASSESSMENT
PUI due to Acute hypoxic respiratory failure with Bilateral infiltrates, RVP/Flu A/B: ________. COVID-19 collected and Pending.   Admit to medical unit _______ with   Supplemental oxygen as needed to keep Sao2>94%  Airborne and contact isolation  Zosyn IVPB stat does given in ED. Will cont Zosyn and add Azithromycin   Cont Gentle IV hydration with NS 75cc/hr  Albuterol INH   Consider ID Consult  Please eval EKG for QTC  h/o Hypercalcemia, resolved, currently within nl limits 9.1.   h/o Constipation   h/o HTN, currently hypotensive, will hold amlodipine  Vit D Def, resume vitamin supplement if clear swallow eval  DM  Fall / Aspiration risk  - bedside Swallow eval ordered  - needs assistance with ALL ADL's Acute metabolic encephalopathy, PUI due to Acute hypoxic respiratory failure with Bilateral infiltrates, COVID-19 collected and Pending.   Admit to medical unit monitor with   Supplemental oxygen as needed to keep Sao2>94%  Airborne and contact isolation  IV Zosyn and  Azithromycin   gentle IV hydration due to NPO, hypotension   Albuterol INH   Consider psychiatry Consult  Please eval EKG for QTC  h/o Hypercalcemia, resolved, currently within nl limits 9.1.   h/o Constipation fleet enema PRN  h/o HTN, currently hypotensive, will hold amlodipine  h/o Vit D Def, resume vitamin supplement if clear swallow eval  h/o DM, no medications noted on patient med list from [facility], presented with hyperglycemia, f/u A1C, RISS, Accu check, hypoglycemia protocol.   Fall / Aspiration risk  - bedside Swallow eval ordered  - needs assistance with ALL ADL's   - NPO until dysphagia screening is completed    h/o Schizoaffective, bipolar type; with h/o combative behavior. Resume oral antipsychotic if able to swallow. 80yoM; pmh of HTN, thrombocytopenia, schizoaffective disorder, hyponatremia BIBEMS c/o cough and fever x 3-4 days, associated w/ SOB    Acute metabolic encephalopathy, PUI due to Acute hypoxic respiratory failure with Bilateral infiltrates, COVID-19 collected and Pending.   Admit to medical unit monitor with   Supplemental oxygen as needed to keep Sao2>94%  Airborne and contact isolation  IV Zosyn and  Azithromycin   gentle IV hydration due to NPO, hypotension   Albuterol INH   Consider psychiatry Consult  Please eval EKG for QTC  h/o Hypercalcemia, resolved, currently within nl limits 9.1.   h/o Constipation fleet enema PRN  HTN, currently hypotensive, will hold amlodipine  h/o Vit D Def, resume vitamin supplement if clear swallow eval  h/o DM, no medications noted on patient med list from [facility], presented with hyperglycemia, f/u A1C, RISS, Accu check, hypoglycemia protocol.   Fall / Aspiration risk  - bedside Swallow eval ordered  - needs assistance with ALL ADL's   - NPO until dysphagia screening is completed    h/o Schizoaffective, bipolar type; with h/o combative behavior. Resume oral antipsychotic if able to swallow.

## 2020-04-08 NOTE — BEHAVIORAL HEALTH ASSESSMENT NOTE - NSBHCONSULTRECOMMENDOTHER_PSY_A_CORE FT
hold psych meds if lethargic  surrogate consent if need from next of kin as patient unable to give consent dure to medical condition

## 2020-04-08 NOTE — GOALS OF CARE CONVERSATION - ADVANCED CARE PLANNING - CONVERSATION DETAILS
SHELLY contacted by Dr Bolanos hospitalist regarding [place] patient and concerns for intubation . SHELLY advised Dr Bolanos that patient under [place] guidelines for [facility] patients , a MOLST checklist must be initiated and coordinated with [facility] before directives initiated. Dr Bolanos reports that cousin Vera Crawford whom doctor called was unwilling to be decision maker. Since no surrogate available or willing to make decisions then MOLST checklist 4 but be initiated which includes ethics consult requirement. SHELLY contacted Tia in ethics to advise of needed consult for [facility] patient with MOLST checklist.    SHELLY contacted [facility]  to advise of case and awaiting formal ethics consult to provide to .     Awaiting ethics consult and contact from  to proceed with directives.

## 2020-04-08 NOTE — H&P ADULT - NSICDXPASTMEDICALHX_GEN_ALL_CORE_FT
PAST MEDICAL HISTORY:  Hyperparathyroidism     Hypertension     Hyponatremia Due to Oxycabazepine    Poor historian     Schizo affective schizophrenia bipolar type    Schizoaffective disorder     Thrombocytopenia due to Lauren. Acid

## 2020-04-08 NOTE — ED PROVIDER NOTE - PHYSICAL EXAMINATION
General:   moderate resp distress  Head:     NC/AT, EOMI, oral mucosa moist  Neck:     trachea midline  Lungs:   bibasilar crackles  CVS:     S1S2, RRR, no m/g/r  Abd:     +BS, s/nt/nd, no organomegaly  Ext:    2+ radial and pedal pulses, no c/c/e  Neuro: AAOx3, no sensory/motor deficits

## 2020-04-08 NOTE — BEHAVIORAL HEALTH ASSESSMENT NOTE - DETAILS
NA per [facility] alerts past h/o aggression oxcarbazepine- hyponatremia  valproic acid- thrombocytopenia

## 2020-04-08 NOTE — ED ADULT NURSE REASSESSMENT NOTE - NS ED NURSE REASSESS COMMENT FT1
pt  received from outgoing RN in stable condition. Pt is being admitted report was given by outgoing RN , pt is pending transport.

## 2020-04-08 NOTE — ED PROVIDER NOTE - OBJECTIVE STATEMENT
80yoM; pmh of HTN, thrombocytopenia, schizoaffective disorder, hyponatremia; BIBEMS c/o cough and fever x 3-4 days, associated w/ SOB which has been progressively worsening upon arrival. Pt resides at [place], and had a SpO2 of 60%. Patient placed on NRB and is currently satting at 92%. Positive sick contact at [place] with covid.   pmh: HTN, thrombocytopenia, schizoaffective disorder, hyponatremia  SOCIAL: No tobacco/illicit substance use/socialEtOH

## 2020-04-08 NOTE — ED PROVIDER NOTE - NS_ATTENDINGSCRIBE_ED_ALL_ED
Progress Notes by Gladys Elaine DO at 18 04:56 PM     Author:  Gladys Elaine DO Service:  (none) Author Type:  Physician     Filed:  18 05:56 PM Encounter Date:  3/22/2018 Status:  Signed     :  Gladys Elaine DO (Physician)            CC: Routine physical[AS1.1T]    New[AS1.1M] patient    SUBJECTIVE  HPI:Margaret Joe is a 35 year old female who presents today for a routine physical[AS1.1T] with[AS1.1M] pelvic exam.    Additional concerns today are:[AS1.1T] interested in contraception - either Mirena or Paragard. Reports to having Paragard in the past without issues.[AS1.2M]      Depression Screening:  Over the past 2 weeks, has patient felt down, depressed or hopeless?[AS1.1T] No[AS1.2M]  Over the past 2 weeks, has patient felt little interest or pleasure in doing things?[AS1.1T] No[AS1.2M]    On the basis of the above screen, the following is initiated:[AS1.1T]  Normal screen, continue to monitor for symptoms over time[AS1.2M]    Does patient exercise?[AS1.1T] Yes - Type: Walking Frequency: Sporadic (no set schedule)[AS1.2M]  Was counseling given:[AS1.1T] Yes[AS1.2M]    Past Medical History:     Diagnosis  Date   • NO SIGNIFICANT MEDICAL HISTORY NOTED      Past Surgical History:      Procedure  Laterality Date   •  SECTION       Social History     Social History      • Marital status:       Spouse name: N/A   • Number of children:  N/A   • Years of education:  N/A     Occupational History     • Homemaker      Social History Main Topics      • Smoking status:  Never Smoker   • Smokeless tobacco:  Never Used   • Alcohol use  No   • Drug use:  No   • Sexual activity:  Yes     Partners: Male     Birth control/ protection: Pill       Family History       Problem   Relation Age of Onset   • GI  Father      Gastritis     • Muscular/Skeletal  Father      Gout     • Muscular/Skeletal  Brother 19     Rheumatoid arthritis     • * Healthy  Mother    • * Healthy  Father         Allergies: Cefazolin and Latex[AS1.1T]    No current outpatient prescriptions on file.[AS1.3T]         Relevant labs:  No results found for: CHOL, HDL, LDL, TRIG   Lab Results      Component  Value Date    GLUCOSE 76 10/19/2015     No results found for: TSH     Review of Systems:  General:[AS1.1T] denies fever, night sweats, weight changes, appetite changes and sleep problems[AS1.2M]  Skin:[AS1.1T]No problems with hair or nails. No rash. No new skin lesions.[AS1.2M]  Heent:[AS1.1T] Pt denies problems with head, eyes, ears, nose, mouth, throat and neck[AS1.2M]  Respiratory:[AS1.1T] No coughing, wheezing, changes in voice,  nor shortness of breath[AS1.2M]  Cardiovascular:[AS1.1T]No chest pain, palpitations or other cardiac complaints noted[AS1.2M]  Gastrointestinal:[AS1.1T] No diarrhea, constipation, abdominal pain or other complaints noted[AS1.2M]  Genitourinary:[AS1.1T] Pt. denies urinary pain, bleeding and voiding problems[AS1.2M]  Musculoskeletal:[AS1.1T] Pt. denies pain, swelling, weakness or limitation of motion[AS1.2M]  Neurologic:[AS1.1T]Pt. denies syncope, seizures, paralysis, involuntary movements or gait problems[AS1.2M]  Psychiatric:[AS1.1T] Pt denies sleep, anxiety, depression, sexual and other psychiatric problems[AS1.2M]  All other systems reviewed are negative    OBJECTIVE  Vitals: BP 90/56  Pulse 78  Temp 98.8 °F (37.1 °C) (Tympanic)  Ht 5' 1.5\" (1.562 m)  Wt 132 lb (59.9 kg)  LMP 03/10/2018  BMI 24.54 kg/m2  Margaret's BMI is 24.54, which is[AS1.1T] within normal parameters.(Ages 18-64 >/= 18.5 and <25; Over age 65 >/= 23 and <30)[AS1.4M]    Physical Exam:  General appearance -[AS1.1T] alert & oriented, pleasant and comfortable[AS1.2M]  Skin -[AS1.1T] Skin color, texture, turgor normal. No rashes or lesions.[AS1.2M]  Head -[AS1.1T] Normocephalic. No masses, lesions, tenderness or abnormalities[AS1.2M]  Eyes -[AS1.1T] conjunctivae/corneas clear. PERRL, EOM's intact[AS1.2M]  Ears -[AS1.1T]  External ears normal. Canals clear. TM's normal.[AS1.2M]  Nose/Sinuses -[AS1.1T] Nares normal. Septum midline. Mucosa normal. No drainage or sinus tenderness.[AS1.2M]  Oropharynx -[AS1.1T] Lips, mucosa, tongue, and gums normal. Oropharynx pink and moist.[AS1.2M]  Neck -[AS1.1T] Neck supple. No adenopathy.  Thyroid symmetric, normal size and no nodules.[AS1.2M]  Lungs -[AS1.1T] clear to auscultation without wheezes, rhonchi or rales[AS1.2M]  Heart -[AS1.1T] RRR w/o S3, S4, or murmurs.[AS1.2M]    Breasts -[AS1.1T] Inspection neg. No nipple discharge or bleeding. No mass palpated and no tenderness. Fibrous breast tissue: No. Breasts are symmetrical Axilla neg.[AS1.2M]   Abdomen -[AS1.1T] Abdomen soft, non-tender. BS normal. No masses, organomegaly, rebound or guarding.[AS1.2M]  Lower Extremities -[AS1.1T] Extremities normal. No deformities, edema, or skin discoloration[AS1.2M]  Musculoskeletal -[AS1.1T] Normal gait, ROM of all joints and normal strength and muscle tone in upper and lower extremities. No joint effusion, swelling or warmth.[AS1.2M]  Neuro -[AS1.1T] Reflexes normal and symmetric. Sensation, motor, and cranial nerves grossly normal[AS1.2M]  Genitalia -[AS1.1T] Pap smear obtained: Yes External Genitalia: Normal appearance and hair distribution.  No lesions noted Urethral Meatus: Normal size and location, no lesions Bladder: No fullness, masses or tenderness Vagina: Normal general appearance, no discharge or lesions.  Adequate pelvic support.  No cystocele or rectocele found Cervix: Normal appearance without lesions or discharge Uterus: Normal size, contour, position, mobility, and support.  No tenderness Adnexa/Parametria:  No masses, tenderness, organomegaly or nodularity noted[AS1.2M]    ASSESSMENT/PLAN[AS1.1T]   Screening for cervical cancer  (primary encounter diagnosis)  Plan: PAP SMEAR, THIN PREP, HPV, HIGH RISK, PAP         SMEAR, THIN PREP, HPV, HIGH RISK       Preventative health care  Plan: CBC  WITH DIFF, COMPREHENSIVE MET PNL (FAST),         LIPID PROFILE, TSH (WITH REFLEX TO FREE T4    Encounter for contraceptive management, unspecified type  Plan: OBSTETRICS/GYNECOLOGY ORDER (ALL SITES)        Margaret Joe[AS1.5T] interested in Mirena or Copper IUD - information pamphlet provided.[AS1.4M] Margaret Joe[AS1.6T] to see OB-Gyn.[AS1.4M]     Preventative Care Discussions:[AS1.1T]   Repeat physical exam in 1 years  Regular exercise - recommend 30 minutes 5 to 7 days a week[AS1.4M]      Electronically Signed by:    Gladys Elaine DO , 3/22/2018[AS1.1T]       Revision History        User Key Date/Time User Provider Type Action    > AS1.6 03/23/18 05:56 PM Gladys Elaine DO Physician Sign     AS1.5 03/23/18 05:55 PM Gladys Elaine DO Physician      AS1.4 03/23/18 05:53 PM Gladys Elaine DO Physician      AS1.3 03/22/18 05:21 PM Gladys Elaine DO Physician      AS1.2 03/22/18 05:20 PM Gladys Elaine DO Physician      AS1.1 03/22/18 04:56 PM Gladys Elaine DO Physician     M - Manual, T - Template             I personally performed the service described in the documentation recorded by the scribe in my presence, and it accurately and completely records my words and actions.

## 2020-04-08 NOTE — ED ADULT NURSE REASSESSMENT NOTE - NS ED NURSE REASSESS COMMENT FT1
MD. Bolanos called to make aware pt. is de-sating. pt. is on non-rebreather and 8LNC. pt. not keeping mask on. redirected by RN and Jody castelan to leave mask on. pt. sating well at 93%.

## 2020-04-08 NOTE — BEHAVIORAL HEALTH ASSESSMENT NOTE - NSBHCHARTREVIEWVS_PSY_A_CORE FT
Vital Signs Last 24 Hrs  T(C): 36.5 (08 Apr 2020 08:12), Max: 36.5 (08 Apr 2020 01:41)  T(F): 97.7 (08 Apr 2020 08:12), Max: 97.7 (08 Apr 2020 01:41)  HR: 93 (08 Apr 2020 08:12) (81 - 109)  BP: 98/56 (08 Apr 2020 08:12) (84/45 - 105/68)  RR: 20 (08 Apr 2020 08:12) (20 - 28)  SpO2: 97% (08 Apr 2020 08:12) (85% - 100%)

## 2020-04-08 NOTE — BEHAVIORAL HEALTH ASSESSMENT NOTE - SUMMARY
80 yr old admitted with pneumonia stephen COVID -19  presently with hypoactive delirium due to sepsis

## 2020-04-09 NOTE — GOALS OF CARE CONVERSATION - ADVANCED CARE PLANNING - CONVERSATION DETAILS
SHELLY had been coordinating with Ethics and  Mehul Waddell form Office of [facility] [employer] [facility] regarding directives. At present Ethics consult only acknowledged that DNR was appropriate at this time. Office of [facility] provided letter of no objection to DNR for patient. MOLST completed for DNR only . case discussed with palliative physician DR Quan as well as hospitalist Dr Bolanos. Case will be monitored for patient decompensation and if appropriate additional request for DNI can be submitted for ethics review and final oversight by  at [facility] Legal services. palliative care to follow. Unit SHELLY Brandon advised.

## 2020-04-09 NOTE — PROGRESS NOTE ADULT - ASSESSMENT
Hypoxic respiratory failure with b/l infiltrates on CXR  -Elevated inflammatory markers   -COVID-19 collected, negative twice sent again   very high suspicion , seen by ID .  also started on antibiotics to cover for PNA   -Supplemental oxygen as needed to keep Sao2>94%, still needing NRM   -Airborne and contact isolation  -Albuterol INH, Tessalon perle   -Cont with Vitamin C       on  Plaquenil , steroids     2. Hx of schizophrenia   remains on home regimen     3. Dysphagia   will do bedside evaluation     4. DVT prophylaxis  lovenox    patient does not have guardian or decision making capacity , ethics and palliative care teams are following ,as per legal made DNR .   carries guarded prognosis

## 2020-04-09 NOTE — PROVIDER CONTACT NOTE (OTHER) - BACKGROUND
Pt transfer from [place]. Pt on nonrebreather mask 15L. On wrist restraints for safety, unable to prone.

## 2020-04-09 NOTE — CONSULT NOTE ADULT - SUBJECTIVE AND OBJECTIVE BOX
[facility]  INFECTIOUS DISEASES AND INTERNAL MEDICINE at [place]  =======================================================  Juan Antonio Corona MD  Diplomates American Board of Internal Medicine and Infectious Diseases  =======================================================    [facility]-[id number]  ULYSSES JUAN CARLOS     CC: Hypoxic respiratory failure    HPI:  81y/o man with PMH of  HTN, thrombocytopenia, schizoaffective disorder, hyponatremia who was brought from [facility] wiht fever for 3-4 days and also SOB. He has 2 negative COVID test but chest Xray shows bilateral opacities very similar to COVID/viral pneumonia.  Currently he is on NRB with SO2 in mid 90s.   Not answering any questions.     PAST MEDICAL & SURGICAL HISTORY:  Hyponatremia: Due to Oxycabazepine  Hypertension  Thrombocytopenia: due to Lauren. Acid  Hyperparathyroidism  Schizoaffective disorder  Poor historian  Schizo affective schizophrenia: bipolar type  No significant past surgical history      FAMILY HISTORY:  No pertinent family history in first degree relatives    Allergies  Allergy Status Unknown    Antibiotics:  MEDICATIONS  (STANDING):  cefTRIAXone   IVPB 1000 milliGRAM(s) IV Intermittent every 24 hours  doxycycline hyclate Capsule 100 milliGRAM(s) Oral every 12 hours  enoxaparin Injectable 40 milliGRAM(s) SubCutaneous daily  hydroxychloroquine 200 milliGRAM(s) Oral every 12 hours  hydroxychloroquine   Oral   methylPREDNISolone sodium succinate Injectable 80 milliGRAM(s) IV Push every 24 hours    REVIEW OF SYSTEMS:  Not answering     Physical Exam:  Vital Signs Last 24 Hrs  T(C): 36.4 (09 Apr 2020 07:58), Max: 36.9 (08 Apr 2020 20:45)  T(F): 97.6 (09 Apr 2020 07:58), Max: 98.4 (08 Apr 2020 20:45)  HR: 81 (09 Apr 2020 07:58) (70 - 82)  BP: 122/70 (09 Apr 2020 07:58) (93/50 - 122/70)  RR: 20 (09 Apr 2020 07:58) (20 - 20)  SpO2: 94% (09 Apr 2020 09:58) (91% - 97%)  GEN: on NRB mask, mild respiratory distress  HEENT: normocephalic and atraumatic. EOMI. PERRL.    NECK: Supple.  No lymphadenopathy   LUNGS: poor air movment  HEART: Regular rate and rhythm  ABDOMEN: Soft, nontender, and nondistended.  Positive bowel sounds.    EXTREMITIES: no edema.  NEUROLOGIC: grossly intact.  PSYCHIATRIC: not answering   SKIN: No rash    Labs:  04-07    141  |  100  |  37.0<H>  ----------------------------<  164<H>  4.6   |  27.0  |  0.81    Ca    9.1      07 Apr 2020 21:06    TPro  6.5<L>  /  Alb  2.6<L>  /  TBili  0.3<L>  /  DBili  x   /  AST  83<H>  /  ALT  42<H>  /  AlkPhos  46  04-07                        10.4   9.35  )-----------( 184      ( 07 Apr 2020 21:06 )             32.5     PT/INR - ( 07 Apr 2020 21:06 )   PT: 15.8 sec;   INR: 1.38 ratio    PTT - ( 07 Apr 2020 21:06 )  PTT:28.3 sec    LIVER FUNCTIONS - ( 07 Apr 2020 21:06 )  Alb: 2.6 g/dL / Pro: 6.5 g/dL / ALK PHOS: 46 U/L / ALT: 42 U/L / AST: 83 U/L / GGT: x           CARDIAC MARKERS ( 07 Apr 2020 21:06 )  x     / <0.01 ng/mL / x     / x     / x        ABG - ( 07 Apr 2020 20:05 )  pH, Arterial: 7.46  pH, Blood: x     /  pCO2: 47    /  pO2: 59    / HCO3: 32    / Base Excess: 8.0   /  SaO2: 89        RECENT CULTURES:  None     All imaging and other data have been reviewed.  < from: Xray Chest 1 View AP/PA. (04.07.20 @ 21:04) >   EXAM:  XR CHEST AP OR PA 1V                        PROCEDURE DATE:  04/07/2020    INTERPRETATION:  CLINICAL INDICATION: Shortness of breath cough and fever  Portable frontal view of the chest is submitted.  COMPARISON: Chest x-ray 8/3/2018  FINDINGS: Cardiac silhouette is within normal limits of size. Diffuse bilateral airspace opacities. Costophrenic angles are sharp. The osseous structures are grossly unremarkable.  IMPRESSION:   Diffuse bilateral airspace opacities may be infectious versus edema.      Assessment and Plan:   81y/o man with PMH of  HTN, thrombocytopenia, schizoaffective disorder, hyponatremia who was brought from [facility] wiht fever for 3-4 days and also SOB. He has 2 negative COVID test but chest Xray shows bilateral opacities very similar to COVID/viral pneumonia.    Viral pneumonia   CHF??    - COVID19 neg x 2   - Will repeat COVID19 and RVP  - CXR with bilateral opacities.   - Agree with ceftriaxone 1gm daily  - Will add doxycycline 100mg q12h  - Agree with hydroxycholoroquine 200mg q12h for now  - Inflammatory markers and procalcitonin both normal today.   - Trend WBC and Tm    Will follow.    Case discussed with ED staff. [facility]  INFECTIOUS DISEASES AND INTERNAL MEDICINE at [place]  =======================================================  Juan Antonio Corona MD  Diplomates American Board of Internal Medicine and Infectious Diseases  =======================================================    [facility]-[id number]  ULYSSES JUAN CARLOS     CC: Hypoxic respiratory failure    HPI:  81y/o man with PMH of  HTN, thrombocytopenia, schizoaffective disorder, hyponatremia who was brought from [facility] wiht fever for 3-4 days and also SOB. He has 2 negative COVID test but chest Xray shows bilateral opacities very similar to COVID/viral pneumonia.  Currently he is on NRB with SO2 in mid 90s.   Not answering any questions.     PAST MEDICAL & SURGICAL HISTORY:  Hyponatremia: Due to Oxycabazepine  Hypertension  Thrombocytopenia: due to Lauren. Acid  Hyperparathyroidism  Schizoaffective disorder  Poor historian  Schizo affective schizophrenia: bipolar type  No significant past surgical history      FAMILY HISTORY:  No pertinent family history in first degree relatives    Allergies  Allergy Status Unknown    Antibiotics:  MEDICATIONS  (STANDING):  cefTRIAXone   IVPB 1000 milliGRAM(s) IV Intermittent every 24 hours  doxycycline hyclate Capsule 100 milliGRAM(s) Oral every 12 hours  enoxaparin Injectable 40 milliGRAM(s) SubCutaneous daily  hydroxychloroquine 200 milliGRAM(s) Oral every 12 hours  hydroxychloroquine   Oral   methylPREDNISolone sodium succinate Injectable 80 milliGRAM(s) IV Push every 24 hours    REVIEW OF SYSTEMS:  Not answering     Physical Exam:  Vital Signs Last 24 Hrs  T(C): 36.4 (09 Apr 2020 07:58), Max: 36.9 (08 Apr 2020 20:45)  T(F): 97.6 (09 Apr 2020 07:58), Max: 98.4 (08 Apr 2020 20:45)  HR: 81 (09 Apr 2020 07:58) (70 - 82)  BP: 122/70 (09 Apr 2020 07:58) (93/50 - 122/70)  RR: 20 (09 Apr 2020 07:58) (20 - 20)  SpO2: 94% (09 Apr 2020 09:58) (91% - 97%)  GEN: on NRB mask, mild respiratory distress  HEENT: normocephalic and atraumatic. EOMI. PERRL.    NECK: Supple.  No lymphadenopathy   LUNGS: poor air movment  HEART: Regular rate and rhythm  ABDOMEN: Soft, nontender, and nondistended.  Positive bowel sounds.    EXTREMITIES: no edema.  NEUROLOGIC: grossly intact.  PSYCHIATRIC: not answering   SKIN: No rash    Labs:  04-07    141  |  100  |  37.0<H>  ----------------------------<  164<H>  4.6   |  27.0  |  0.81    Ca    9.1      07 Apr 2020 21:06    TPro  6.5<L>  /  Alb  2.6<L>  /  TBili  0.3<L>  /  DBili  x   /  AST  83<H>  /  ALT  42<H>  /  AlkPhos  46  04-07                        10.4   9.35  )-----------( 184      ( 07 Apr 2020 21:06 )             32.5     PT/INR - ( 07 Apr 2020 21:06 )   PT: 15.8 sec;   INR: 1.38 ratio    PTT - ( 07 Apr 2020 21:06 )  PTT:28.3 sec    LIVER FUNCTIONS - ( 07 Apr 2020 21:06 )  Alb: 2.6 g/dL / Pro: 6.5 g/dL / ALK PHOS: 46 U/L / ALT: 42 U/L / AST: 83 U/L / GGT: x           CARDIAC MARKERS ( 07 Apr 2020 21:06 )  x     / <0.01 ng/mL / x     / x     / x        ABG - ( 07 Apr 2020 20:05 )  pH, Arterial: 7.46  pH, Blood: x     /  pCO2: 47    /  pO2: 59    / HCO3: 32    / Base Excess: 8.0   /  SaO2: 89        RECENT CULTURES:  None     All imaging and other data have been reviewed.  < from: Xray Chest 1 View AP/PA. (04.07.20 @ 21:04) >   EXAM:  XR CHEST AP OR PA 1V                        PROCEDURE DATE:  04/07/2020    INTERPRETATION:  CLINICAL INDICATION: Shortness of breath cough and fever  Portable frontal view of the chest is submitted.  COMPARISON: Chest x-ray 8/3/2018  FINDINGS: Cardiac silhouette is within normal limits of size. Diffuse bilateral airspace opacities. Costophrenic angles are sharp. The osseous structures are grossly unremarkable.  IMPRESSION:   Diffuse bilateral airspace opacities may be infectious versus edema.      Assessment and Plan:   81y/o man with PMH of  HTN, thrombocytopenia, schizoaffective disorder, hyponatremia who was brought from [facility] wiht fever for 3-4 days and also SOB. He has 2 negative COVID test but chest Xray shows bilateral opacities very similar to COVID/viral pneumonia.    Viral pneumonia   CHF??    - COVID19 neg x 2   - Will repeat COVID19 and RVP  - CXR with bilateral opacities.   - Procal is 0.2, bacterial pneumonia is less likely   - Continue doxycycline 100mg q12h  - Can ceftriaxone 1gm daily   - Agree with hydroxychloroquine 200mg q12h for now  - Inflammatory markers are high and there is lymphopenia similar to COVID19   - Trend WBC and Tm    Will follow.    Case discussed with ED staff.

## 2020-04-09 NOTE — CONSULT NOTE ADULT - SUBJECTIVE AND OBJECTIVE BOX
PALLIATIVE CONSULT    CC: Patient is a 80y old  Male who presents with a chief complaint of Hypoxic respiratory failure, PUI for COVID 19; acute metabolic encephalopathy (08 Apr 2020 16:11)    HPI:  Mr. Rodríguez is a 80 year old male with PMHx of HTN, thrombocytopenia, schizoaffective disorder, resident of [facility] presented with cough and fever x 3-4 days. ROS + dyspnea and sick contact as other residents tested +COVID. In ER, found to be hypoxic and placed on NRB with improvement. CXR with bilateral opacities concerning for PNA/COVID infection. COVID test x 2 neg. Labs remarkable to transaminitis and elevation in inflammatory markers. Started on empiric IV antibiotics. Course complicated by intermittent agitation on enhanced supervision and dysphagia. Palliative consulted to assist with advance directives and goc. Next of kin is a cousin Vera who has declined to participate in any medical decision making. No other known surrogates. Pt is a poor historian and above info from chart review and medical staff.     Unable to perform ROS due to mental status.    PERTINENT PMH REVIEWED: Yes     PAST MEDICAL & SURGICAL HISTORY:  Hyponatremia: Due to Oxycabazepine  Hypertension  Thrombocytopenia: due to Lauren. Acid  Hyperparathyroidism  Schizoaffective disorder  Poor historian  Schizo affective schizophrenia: bipolar type  No significant past surgical history      SOCIAL HISTORY:    Admitted from:  [facility] facility    Baseline ADLs (prior to admission): unable to assess  Karnofsky:  30-40%    Surrogate/HCP/Guardian:   - cousin has declined to participate in any medical decisions  - no surrogate decision maker at this time    ADVANCE DIRECTIVES: DNR only approved by [facility] ; SHELIA in chart.     FAMILY HISTORY:  No pertinent family history in first degree relatives  Unable to obtain history due to mentation.      Allergies    Allergy Status Unknown    Intolerances    MEDICATIONS  (STANDING):  enoxaparin Injectable 40 milliGRAM(s) SubCutaneous daily  hydroxychloroquine 200 milliGRAM(s) Oral every 12 hours  hydroxychloroquine   Oral   methylPREDNISolone sodium succinate Injectable 80 milliGRAM(s) IV Push every 24 hours  sodium chloride 0.9%. 1000 milliLiter(s) (50 mL/Hr) IV Continuous <Continuous>  valproic  acid Syrup 750 milliGRAM(s) Oral every 12 hours    MEDICATIONS  (PRN):  acetaminophen   Tablet .. 650 milliGRAM(s) Oral every 4 hours PRN Temp greater or equal to 38.5C (101.3F)  saline laxative (FLEET) Rectal Enema 1 Enema Rectal at bedtime PRN constipation      Physical Exam:  Due to the pandemic COVID-19 infection and pt's clinical presentation still concerning for COVID, no bedside physical exam was done to limit spread of infection. Examination highlights were provided by bedside nurse and/or primary team. Objective data were reviewed in detail.    Vital Signs Last 24 Hrs  T(C): 36.4 (09 Apr 2020 07:58), Max: 36.9 (08 Apr 2020 20:45)  T(F): 97.6 (09 Apr 2020 07:58), Max: 98.4 (08 Apr 2020 20:45)  HR: 81 (09 Apr 2020 07:58) (70 - 82)  BP: 122/70 (09 Apr 2020 07:58) (93/50 - 122/70)  BP(mean): --  RR: 20 (09 Apr 2020 07:58) (20 - 20)  SpO2: 94% (09 Apr 2020 09:58) (91% - 97%)    LABS:                        10.4   9.35  )-----------( 184      ( 07 Apr 2020 21:06 )             32.5     04-07    141  |  100  |  37.0<H>  ----------------------------<  164<H>  4.6   |  27.0  |  0.81    Ca    9.1      07 Apr 2020 21:06    TPro  6.5<L>  /  Alb  2.6<L>  /  TBili  0.3<L>  /  DBili  x   /  AST  83<H>  /  ALT  42<H>  /  AlkPhos  46  04-07    PT/INR - ( 07 Apr 2020 21:06 )   PT: 15.8 sec;   INR: 1.38 ratio         PTT - ( 07 Apr 2020 21:06 )  PTT:28.3 sec      RADIOLOGY & ADDITIONAL STUDIES:     CXR 4/7/2020  FINDINGS: Cardiac silhouette is within normal limits of size. Diffuse bilateral airspace opacities. Costophrenic angles are sharp. The osseous structures are grossly unremarkable.    IMPRESSION:   Diffuse bilateral airspace opacities may be infectious versus edema.        Thank you for the opportunity to assist with the care of this patient.   [facility] [phone].

## 2020-04-09 NOTE — CHART NOTE - NSCHARTNOTEFT_GEN_A_CORE
Case reviewed, discussed with hospitalist Dr. Bolanos and palliative team including SHELLY Swenson. Case reviewed by ethics and [facility]  yesterday to assist with end of life decisions in particular life sustaining interventions ex. CPR and intubation. Per note, approval for DNR status at present time given comorbidities and acute issues. MOLST to be completed with attached [facility]  approval letter for DNR and be placed in chart today. DNR ordered in EMR.

## 2020-04-09 NOTE — CONSULT NOTE ADULT - ASSESSMENT
80M resident of [facility] facility with schizoaffective disorder, thrombocytopenia, HTN admitted for dyspnea, cough and fever, found with bilateral pna on CXR, concerning for covid exposure given +sick contact at facility with covid. COVID test x2 neg. Course complicated by mild transaminitis, dysphagia.     PLAN    Pneumonia, Bacterial vs Viral or both  - CXR with bilateral infiltrates  - covid testing x 2 neg, still a high suspicion given clinical presentation, CXR findings, inflammatory markers, transaminitis, O2 requirement  - c/w antibiotics per primary team  - pending ID consult    Acute Respiratory Failure/Dyspnea  - remains on NRB, increased O2 requirement since admission but maintaining adequate saturation    Transaminitis  - unclear etiology, ?underlying COVID infection    Schizoaffective/Bipolar Disorder  - intermittent agitation on enhanced supervision  - evaluated by psych, appreciate input, recommended valproic acid 750mg bid and haloperidol PO conc 5 mg in AM and 7.5 mg at bedtime    Dysphagia  - current NPO  - pending speech/swallow eval  - aspiration precaution    Palliative Care Encounter  Case D/W with hospitalist Dr. Bolanos and Palliative SW Leela. MOLST checklist #4 initiated per [facility] protocol given no formal surrogate and case was reviewed by [facility]  Mehul Waddell. Appreciate Ethics input. Advance directives for DNR approved and implemented. MOLST completed with attached letter from . Will continue to monitor clinical and respiratory status, prognosis to determine appropriateness of DNI. 80M resident of [facility] facility with schizoaffective disorder, thrombocytopenia, HTN admitted for dyspnea, cough and fever, found with bilateral pna on CXR, concerning for covid exposure given +sick contact at facility with covid. COVID test x2 neg. Course complicated by mild transaminitis, dysphagia.     PLAN    Pneumonia, Bacterial vs Viral or both  - CXR with bilateral infiltrates  - covid testing x 2 neg, still a high suspicion given clinical presentation, CXR findings, inflammatory markers, transaminitis, O2 requirement  - c/w antibiotics per primary team  - pending ID consult    Acute Respiratory Failure/Dyspnea  - remains on NRB, increased O2 requirement since admission but maintaining adequate saturation    Transaminitis  - unclear etiology, ?underlying COVID infection    Schizoaffective/Bipolar Disorder  - intermittent agitation on enhanced supervision  - evaluated by psych, appreciate input, recommended valproic acid 750mg bid and haloperidol PO conc 5 mg in AM and 7.5 mg at bedtime    Dysphagia  - current NPO  - pending speech/swallow eval  - aspiration precaution    Palliative Care Encounter  Case D/W with hospitalist Dr. Bolanos and Palliative SW Leela. MOLST checklist #4 initiated by SHELLY per [facility] protocol given no formal surrogate and case was reviewed by [facility]  Mehul Waddell. Appreciate Ethics input. Advance directives for DNR approved and implemented. MOLST completed with attached letter from . Will continue to monitor clinical and respiratory status, prognosis to determine appropriateness of DNI. Overall prognosis guarded given comorbidities, debility, dysphagia, acute infection with still high suspicion of COVID19 infection.

## 2020-04-09 NOTE — PROGRESS NOTE ADULT - SUBJECTIVE AND OBJECTIVE BOX
CC: PNA     INTERVAL HPI/OVERNIGHT EVENTS: seen and examined , as per nursing staff still refusing mask on and off , not agitated in general, still on NRM   unable to obtain ROS due to mentation           Vital Signs Last 24 Hrs  T(C): 36.7 (09 Apr 2020 16:00), Max: 36.9 (08 Apr 2020 20:45)  T(F): 98 (09 Apr 2020 16:00), Max: 98.4 (08 Apr 2020 20:45)  HR: 88 (09 Apr 2020 16:00) (70 - 88)  BP: 100/55 (09 Apr 2020 16:00) (93/50 - 122/70)  BP(mean): --  RR: 20 (09 Apr 2020 16:00) (20 - 20)  SpO2: 95% (09 Apr 2020 16:00) (91% - 97%)    PHYSICAL EXAM:    as per ED         LABS:                        10.4   9.35  )-----------( 184      ( 07 Apr 2020 21:06 )             32.5     04-07    141  |  100  |  37.0<H>  ----------------------------<  164<H>  4.6   |  27.0  |  0.81    Ca    9.1      07 Apr 2020 21:06    TPro  6.5<L>  /  Alb  2.6<L>  /  TBili  0.3<L>  /  DBili  x   /  AST  83<H>  /  ALT  42<H>  /  AlkPhos  46  04-07    PT/INR - ( 07 Apr 2020 21:06 )   PT: 15.8 sec;   INR: 1.38 ratio         PTT - ( 07 Apr 2020 21:06 )  PTT:28.3 sec        MEDICATIONS  (STANDING):  cefTRIAXone   IVPB 1000 milliGRAM(s) IV Intermittent every 24 hours  doxycycline hyclate Capsule 100 milliGRAM(s) Oral every 12 hours  enoxaparin Injectable 40 milliGRAM(s) SubCutaneous daily  hydroxychloroquine 200 milliGRAM(s) Oral every 12 hours  hydroxychloroquine   Oral   methylPREDNISolone sodium succinate Injectable 80 milliGRAM(s) IV Push every 24 hours  sodium chloride 0.9%. 1000 milliLiter(s) (50 mL/Hr) IV Continuous <Continuous>  valproic  acid Syrup 750 milliGRAM(s) Oral every 12 hours    MEDICATIONS  (PRN):  acetaminophen   Tablet .. 650 milliGRAM(s) Oral every 4 hours PRN Temp greater or equal to 38.5C (101.3F)  saline laxative (FLEET) Rectal Enema 1 Enema Rectal at bedtime PRN constipation      RADIOLOGY & ADDITIONAL TESTS:

## 2020-04-10 NOTE — PROGRESS NOTE ADULT - ASSESSMENT
Hypoxic respiratory failure with b/l infiltrates on CXR  -Elevated inflammatory markers   -COVID-19 collected, negative x 3  very high suspicion , seen by ID .  also started on antibiotics to cover for PNA   -Supplemental oxygen as needed to keep Sao2>94%, still needing %   -Airborne and contact isolation  -Albuterol INH, Tessalon perle   -Cont with Vitamin C       on  Plaquenil , steroids   -2 PC CT angio chest today    2. Hx of schizophrenia   remains on home regimen   constant observation for compliance with supplemental oxygen    3. Dysphagia   SLP eval  Currently on Pureed diet    4. DVT prophylaxis  lovenox    GOC: discussion with Dr. Quan Palliative Care. Patient is DNR only, if deteriorates will require ICU consult/intubation. Mr. Rodríguez is a 80 year old male with PMHx of HTN, thrombocytopenia, schizoaffective disorder, resident of [facility] presented with cough and fever x 3-4 days. ROS + dyspnea and sick contact as other residents tested +COVID. In ER, found to be hypoxic and placed on NRB with improvement. CXR with bilateral opacities concerning for PNA/COVID infection. COVID test x 2 neg. Labs remarkable to transaminitis and elevation in inflammatory markers. Started on empiric IV antibiotics. Course complicated by intermittent agitation on enhanced supervision and dysphagia. Palliative consulted to assist with advance directives and goc. 2 PC for CT angio performed 4/10 reveals B/L PE and GGO.         Hypoxic respiratory failure with b/l infiltrates on CXR  -Elevated inflammatory markers   -COVID-19 collected, negative x 3  very high suspicion , seen by ID .  also started on antibiotics to cover for PNA   -Supplemental oxygen as needed to keep Sao2>94%, still needing %   -Airborne and contact isolation  -Albuterol INH, Tessalon perle   -Cont with Vitamin C       on  Plaquenil , steroids   -2 PC CT angio chest today revealed +B/L PE, discussed with Dr. Coles, no intervention indicated, start AC.     2. Hx of schizophrenia   remains on home regimen   constant observation for compliance with supplemental oxygen    3. Dysphagia   SLP eval  Currently on Pureed diet    4. DVT prophylaxis  lovenox    GOC: discussion with Dr. Quan Palliative Care. Patient is DNR only, if deteriorates will require ICU consult/intubation.

## 2020-04-10 NOTE — PROGRESS NOTE ADULT - SUBJECTIVE AND OBJECTIVE BOX
CC: Hypoxic respiratory failure    INTERVAL HPI/OVERNIGHT EVENTS: Patient seen and examined. Pulling off oxygen mask, states "I can't breathe" given IV Morphine, one to one placed for compliance with oxygen. Appears comfortable with mask in place.     Vital Signs Last 24 Hrs  T(C): 36.6 (10 Apr 2020 09:03), Max: 36.7 (09 Apr 2020 16:00)  T(F): 97.9 (10 Apr 2020 09:03), Max: 98 (09 Apr 2020 16:00)  HR: 87 (10 Apr 2020 09:03) (84 - 88)  BP: 132/59 (10 Apr 2020 09:03) (100/55 - 132/59)  BP(mean): --  RR: 20 (10 Apr 2020 09:03) (20 - 28)  SpO2: 95% (10 Apr 2020 09:03) (84% - 95%)    ROS:  Limited due to mental status    PE:  GEN: on NRB mask, mild respiratory distress  HEENT: normocephalic and atraumatic. EOMI. PERRL.    NECK: Supple.  No lymphadenopathy   LUNGS: Decrease BS B/L  HEART: Regular rate and rhythm  ABDOMEN: Soft, nontender, and nondistended.  Positive bowel sounds.    EXTREMITIES: no edema.  NEUROLOGIC: grossly intact.  PSYCHIATRIC: agitated at times  SKIN: No rash, dry     LABS:                  MEDICATIONS  (STANDING):  cefTRIAXone   IVPB 1000 milliGRAM(s) IV Intermittent every 24 hours  doxycycline hyclate Capsule 100 milliGRAM(s) Oral every 12 hours  enoxaparin Injectable 40 milliGRAM(s) SubCutaneous daily  hydroxychloroquine 200 milliGRAM(s) Oral every 12 hours  hydroxychloroquine   Oral   methylPREDNISolone sodium succinate Injectable 80 milliGRAM(s) IV Push every 24 hours  valproic  acid Syrup 750 milliGRAM(s) Oral every 12 hours    MEDICATIONS  (PRN):  acetaminophen   Tablet .. 650 milliGRAM(s) Oral every 4 hours PRN Temp greater or equal to 38.5C (101.3F)  morphine  - Injectable 2 milliGRAM(s) IV Push every 6 hours PRN RR>22  saline laxative (FLEET) Rectal Enema 1 Enema Rectal at bedtime PRN constipation      RADIOLOGY & ADDITIONAL TESTS: CC: Hypoxic respiratory failure    INTERVAL HPI/OVERNIGHT EVENTS: Patient seen and examined. Pulling off oxygen mask, states "I can't breathe" given IV Morphine, one to one placed for compliance with oxygen. Appears comfortable with mask in place.     Vital Signs Last 24 Hrs  T(C): 36.6 (10 Apr 2020 09:03), Max: 36.7 (09 Apr 2020 16:00)  T(F): 97.9 (10 Apr 2020 09:03), Max: 98 (09 Apr 2020 16:00)  HR: 87 (10 Apr 2020 09:03) (84 - 88)  BP: 132/59 (10 Apr 2020 09:03) (100/55 - 132/59)  BP(mean): --  RR: 20 (10 Apr 2020 09:03) (20 - 28)  SpO2: 95% (10 Apr 2020 09:03) (84% - 95%)    ROS:  Limited due to mental status    PE:  GEN: on NRB mask, mild respiratory distress  HEENT: normocephalic and atraumatic. EOMI. PERRL.    NECK: Supple.  No lymphadenopathy   LUNGS: Decrease BS B/L  HEART: Regular rate and rhythm  ABDOMEN: Soft, nontender, and nondistended.  Positive bowel sounds.    EXTREMITIES: no edema.  NEUROLOGIC: grossly intact.  PSYCHIATRIC: agitated at times  SKIN: No rash, dry     LABS:                  MEDICATIONS  (STANDING):  cefTRIAXone   IVPB 1000 milliGRAM(s) IV Intermittent every 24 hours  doxycycline hyclate Capsule 100 milliGRAM(s) Oral every 12 hours  enoxaparin Injectable 40 milliGRAM(s) SubCutaneous daily  hydroxychloroquine 200 milliGRAM(s) Oral every 12 hours  hydroxychloroquine   Oral   methylPREDNISolone sodium succinate Injectable 80 milliGRAM(s) IV Push every 24 hours  valproic  acid Syrup 750 milliGRAM(s) Oral every 12 hours    MEDICATIONS  (PRN):  acetaminophen   Tablet .. 650 milliGRAM(s) Oral every 4 hours PRN Temp greater or equal to 38.5C (101.3F)  morphine  - Injectable 2 milliGRAM(s) IV Push every 6 hours PRN RR>22  saline laxative (FLEET) Rectal Enema 1 Enema Rectal at bedtime PRN constipation      RADIOLOGY & ADDITIONAL TESTS:   EXAM:  CT ANGIO CHEST (W)AW IC                          PROCEDURE DATE:  04/10/2020          INTERPRETATION:  CLINICAL INFORMATION: Hypoxia, suspected pulmonary embolism.    COMPARISON: None.    PROCEDURE:   CT Angiography of the Chest.  90 ml of Omnipaque 350 was injected intravenously. 10 ml were discarded.  Sagittal and coronal reformats were performed as well as 3D (MIP) reconstructions.      FINDINGS:    LUNGS AND AIRWAYS: Patent central airways.  Diffuse patchy groundglass opacity throughout both lungs.    PLEURA: No pleural effusion.    MEDIASTINUM AND GLORIA: No lymphadenopathy.    VESSELS: There is bilateral pulmonary embolism within the lobar and segmental branches involving the upper and lower lobes. The thoracic aorta and main pulmonary artery are normal in caliber.    HEART: Heart size is normal. No pericardial effusion.    CHEST WALL AND LOWER NECK: The thyroid is within normal limits. There is no supraclavicular or axillary lymphadenopathy.    VISUALIZED UPPER ABDOMEN: Theimaged portion of the liver, spleen and kidneys are within normal limits.    BONES: Degenerative change of the thoracic spine with osteophytes.    IMPRESSION:     Pattern of GGO suggests infection including atypical pneumonia/viral infection from atypical agents including COVID-19 (C19V-1).    Bilateral pulmonary embolism as detailed above.    Findings were discussed with Dr. WILMER TAY [phone] 4/10/2020 12:49 PM by Dr. Cartagena with read back confirmation.                  KATERINE LINDSAY M.D. ATTENDING RADIOLOGIST  This document has been electronically signed. Apr 10 2020 12:52PM

## 2020-04-10 NOTE — PROGRESS NOTE ADULT - SUBJECTIVE AND OBJECTIVE BOX
[facility]  INFECTIOUS DISEASES AND INTERNAL MEDICINE at [place]  =======================================================  Juan Antonio Aldana MD FACJESSI Corona MD  Diplomates American Board of Internal Medicine and Infectious Diseases  =======================================================    [facility]-[id number]  ULYSSES RANGEL     CC: Hypoxic respiratory failure    ID followup- PUI COVID but negative x 3  confused and keeps removing NRB    PAST MEDICAL & SURGICAL HISTORY:  Hyponatremia: Due to Oxycabazepine  Hypertension  Thrombocytopenia: due to Lauren. Acid  Hyperparathyroidism  Schizoaffective disorder  Poor historian  Schizo affective schizophrenia: bipolar type  No significant past surgical history      FAMILY HISTORY:  No pertinent family history in first degree relatives    Allergies  Allergy Status Unknown    Antibiotics:  MEDICATIONS  (STANDING):  cefTRIAXone   IVPB 1000 milliGRAM(s) IV Intermittent every 24 hours  doxycycline hyclate Capsule 100 milliGRAM(s) Oral every 12 hours  enoxaparin Injectable 40 milliGRAM(s) SubCutaneous daily  hydroxychloroquine 200 milliGRAM(s) Oral every 12 hours  hydroxychloroquine   Oral   methylPREDNISolone sodium succinate Injectable 80 milliGRAM(s) IV Push every 24 hours    REVIEW OF SYSTEMS:  Not answering     Physical Exam:  Vital Signs Last 24 Hrs  T(C): 36.4 (09 Apr 2020 07:58), Max: 36.9 (08 Apr 2020 20:45)  T(F): 97.6 (09 Apr 2020 07:58), Max: 98.4 (08 Apr 2020 20:45)  HR: 81 (09 Apr 2020 07:58) (70 - 82)  BP: 122/70 (09 Apr 2020 07:58) (93/50 - 122/70)  RR: 20 (09 Apr 2020 07:58) (20 - 20)  SpO2: 94% (09 Apr 2020 09:58) (91% - 97%)  GEN: on NRB mask, mild respiratory distress  HEENT: normocephalic and atraumatic. EOMI. PERRL.    NECK: Supple.  No lymphadenopathy   LUNGS: poor air movment  HEART: Regular rate and rhythm  ABDOMEN: Soft, nontender, and nondistended.  Positive bowel sounds.    EXTREMITIES: no edema.  NEUROLOGIC: grossly intact.  PSYCHIATRIC: not answering   SKIN: No rash    Labs:  04-07    141  |  100  |  37.0<H>  ----------------------------<  164<H>  4.6   |  27.0  |  0.81    Ca    9.1      07 Apr 2020 21:06    TPro  6.5<L>  /  Alb  2.6<L>  /  TBili  0.3<L>  /  DBili  x   /  AST  83<H>  /  ALT  42<H>  /  AlkPhos  46  04-07                        10.4   9.35  )-----------( 184      ( 07 Apr 2020 21:06 )             32.5     PT/INR - ( 07 Apr 2020 21:06 )   PT: 15.8 sec;   INR: 1.38 ratio    PTT - ( 07 Apr 2020 21:06 )  PTT:28.3 sec    LIVER FUNCTIONS - ( 07 Apr 2020 21:06 )  Alb: 2.6 g/dL / Pro: 6.5 g/dL / ALK PHOS: 46 U/L / ALT: 42 U/L / AST: 83 U/L / GGT: x           CARDIAC MARKERS ( 07 Apr 2020 21:06 )  x     / <0.01 ng/mL / x     / x     / x        ABG - ( 07 Apr 2020 20:05 )  pH, Arterial: 7.46  pH, Blood: x     /  pCO2: 47    /  pO2: 59    / HCO3: 32    / Base Excess: 8.0   /  SaO2: 89        RECENT CULTURES:  None     All imaging and other data have been reviewed.  < from: Xray Chest 1 View AP/PA. (04.07.20 @ 21:04) >   EXAM:  XR CHEST AP OR PA 1V                        PROCEDURE DATE:  04/07/2020    INTERPRETATION:  CLINICAL INDICATION: Shortness of breath cough and fever  Portable frontal view of the chest is submitted.  COMPARISON: Chest x-ray 8/3/2018  FINDINGS: Cardiac silhouette is within normal limits of size. Diffuse bilateral airspace opacities. Costophrenic angles are sharp. The osseous structures are grossly unremarkable.  IMPRESSION:   Diffuse bilateral airspace opacities may be infectious versus edema.    < from: CT Angio Chest w/ IV Cont (04.10.20 @ 12:31) >    IMPRESSION:     Pattern of GGO suggests infection including atypical pneumonia/viral infection from atypical agents including COVID-19 (C19V-1).    Bilateral pulmonary embolism as detailed above.    Findings were discussed with Dr. WILMER TAY [phone] 4/10/2020 12:49 PM by Dr. Cartagena with read back confirmation.      < end of copied text >      Assessment and Plan:   79y/o man with PMH of  HTN, thrombocytopenia, schizoaffective disorder, hyponat[facility] and also SOB. He has 2 negative COVID test but chest Xray shows bilateral opacities very similar to COVID/viral pneumonia however COVID PCr negative x 3. found to have b/l PE. Although patient has negative PCR-patients presentation and thromboembolic event highly suggestive of COVID 19    Viral pneumonia   PE      - COVID19 neg x 3  - RVP not sent  - CXR with bilateral opacities.   - CTa wit b/l PE  - Dc ceftriaxone and doxycyline  - complete course of plaquenil due to high suspicion for COVID 19  - complete steroids methylprednisolone 1 mg/kg Iv q12h x 4 days then 0.5 mg/kg IV q12h x 1 day   - AC for PE  - Continue contact/airborne isolation    Signing off  d/w NP

## 2020-04-10 NOTE — PROGRESS NOTE ADULT - SUBJECTIVE AND OBJECTIVE BOX
FOLLOW UP VISIT    INTERVAL HPI/OVERNIGHT EVENTS:  Per primary team, pt pulling off O2 mask and stating he "cant breathe." S/P IV morphine with improvement. Now on 1:1 observation for compliance of mask. CTA today with bilateral PEs.    Unable to perform ROS due to mentation and underlying psychiatric illness.     MEDICATIONS  (STANDING):  enoxaparin Injectable 80 milliGRAM(s) SubCutaneous every 12 hours  hydroxychloroquine 200 milliGRAM(s) Oral every 12 hours  hydroxychloroquine   Oral   methylPREDNISolone sodium succinate Injectable 80 milliGRAM(s) IV Push every 24 hours  valproic  acid Syrup 750 milliGRAM(s) Oral every 12 hours    MEDICATIONS  (PRN):  acetaminophen   Tablet .. 650 milliGRAM(s) Oral every 4 hours PRN Temp greater or equal to 38.5C (101.3F)  morphine  - Injectable 2 milliGRAM(s) IV Push every 6 hours PRN RR>22  saline laxative (FLEET) Rectal Enema 1 Enema Rectal at bedtime PRN constipation    Physical Exam:  Due to the nature of this patient's COVID-19 isolation status, no bedside physical exam was done to limit spread of infection. Examination highlights were provided by bedside nurse and/or primary team. Objective data were reviewed in detail.    Vital Signs Last 24 Hrs  T(C): 36.6 (10 Apr 2020 09:03), Max: 36.7 (09 Apr 2020 16:00)  T(F): 97.9 (10 Apr 2020 09:03), Max: 98 (09 Apr 2020 16:00)  HR: 87 (10 Apr 2020 09:03) (84 - 88)  BP: 132/59 (10 Apr 2020 09:03) (100/55 - 132/59)  BP(mean): --  RR: 20 (10 Apr 2020 09:03) (20 - 28)  SpO2: 95% (10 Apr 2020 09:03) (84% - 95%)     RADIOLOGY & ADDITIONAL STUDIES:     CTA 4/10/2020  FINDINGS:  LUNGS AND AIRWAYS: Patent central airways.  Diffuse patchy groundglass opacity throughout both lungs.  PLEURA: No pleural effusion.  MEDIASTINUM AND GLORIA: No lymphadenopathy.  VESSELS: There is bilateral pulmonary embolism within the lobar and segmental branches involving the upper and lower lobes. The thoracic aorta and main pulmonary artery are normal in caliber.  HEART: Heart size is normal. No pericardial effusion.  CHEST WALL AND LOWER NECK: The thyroid is within normal limits. There is no supraclavicular or axillary lymphadenopathy.  VISUALIZED UPPER ABDOMEN: The imaged portion of the liver, spleen and kidneys are within normal limits.  BONES: Degenerative change of the thoracic spine with osteophytes.    IMPRESSION:   Pattern of GGO suggests infection including atypical pneumonia/viral infection from atypical agents including COVID-19 (C19V-1).  Bilateral pulmonary embolism as detailed above.    ADVANCE DIRECTIVES: DNR only with approval from [facility] in chart

## 2020-04-10 NOTE — PROGRESS NOTE ADULT - ASSESSMENT
80M resident of [facility] facility with schizoaffective disorder, thrombocytopenia, HTN admitted for dyspnea, cough and fever, found with bilateral pna on CXR, concerning for covid exposure given +sick contact at facility with covid. COVID test x2 neg. Course complicated by mild transaminitis, dysphagia and now bilateral PE on CTA today.    PLAN    Acute Respiratory Failure/Dyspnea  - still on NRB at 100%, complaining of dyspnea  - CTA now with bilateral PEs  - O2 supplement, wean as tolerated, c/w IVP morphine 2 mg PRN pain or dyspnea    Acute Pulmonary Embolism  - CTA noted above  - started on therapeutic lovenox today    Pneumonia, Bacterial vs Viral  - CXR with bilateral infiltrates  - covid testing x 3 neg although clinical presentation still highly suspicious of COVID  - c/w empiric hydroxychloroquine per ID    Schizoaffective/Bipolar Disorder  - intermittent agitation on enhanced supervision  - evaluated by psych, appreciate input, recommended valproic acid 750mg bid and haloperidol PO conc 5 mg in AM and 7.5 mg at bedtime    Dysphagia  - modified diet of pureed, thin  - aspiration precaution    Palliative Care Encounter  Pt with worsening respirtory status and found with new bilateral PE, started on anticoagulation. Advance directives: DNR only approved by [facility] . At this time, not appropriate for a DNI per ethics/ as respiratory status has remained stable with current interventions as we continue to treat underlying reversible acute issues.

## 2020-04-11 NOTE — PROGRESS NOTE ADULT - SUBJECTIVE AND OBJECTIVE BOX
ULYSSES RANGEL  ----------------------------------------  The patient was seen and evaluated for acute hypoxic respiratory. failure. Offers no complaints.    Vital Signs Last 24 Hrs  T(C): 36.7 (11 Apr 2020 10:46), Max: 36.7 (11 Apr 2020 10:46)  T(F): 98 (11 Apr 2020 10:46), Max: 98 (11 Apr 2020 10:46)  HR: 91 (11 Apr 2020 10:46) (87 - 98)  BP: 103/55 (11 Apr 2020 10:46) (103/55 - 133/66)  BP(mean): --  RR: 19 (11 Apr 2020 10:46) (19 - 20)  SpO2: 98% (10 Apr 2020 16:18) (98% - 98%)    PHYSICAL EXAMINATION:  ----------------------------------------  General appearance: No acute distress, Awake, Alert  HEENT: Normocephalic, Atraumatic, Conjunctiva clear, EOMI  Neck: Supple, No JVD, No tenderness  Lungs: Breath sound equal bilaterally, No wheezes, Mild rales  Cardiovascular: S1S2, Regular rhythm  Abdomen: Soft, Nontender, Nondistended, No guarding/rebound, Positive bowel sounds  Extremities: No clubbing, No cyanosis, No edema, No calf tenderness  Neuro: Strength equal bilaterally, No tremors  Psychiatric: Appropriate mood, Normal affect    MEDICATIONS  (STANDING):  enoxaparin Injectable 80 milliGRAM(s) SubCutaneous every 12 hours  hydroxychloroquine 200 milliGRAM(s) Oral every 12 hours  hydroxychloroquine   Oral   valproic  acid Syrup 750 milliGRAM(s) Oral every 12 hours    MEDICATIONS  (PRN):  acetaminophen   Tablet .. 650 milliGRAM(s) Oral every 4 hours PRN Temp greater or equal to 38.5C (101.3F)  morphine  - Injectable 2 milliGRAM(s) IV Push every 6 hours PRN RR>22  saline laxative (FLEET) Rectal Enema 1 Enema Rectal at bedtime PRN constipation      ASSESSMENT / PLAN:  ----------------------------------------  Mr. Rangel is a 80 year old male with PMHx of HTN, thrombocytopenia, schizoaffective disorder, resident of [facility] presented with cough and fever x 3-4 days. ROS + dyspnea and sick contact as other residents tested +COVID. In ER, found to be hypoxic and placed on NRB with improvement. CXR with bilateral opacities concerning for PNA/COVID infection. COVID test x 2 neg. Labs remarkable to transaminitis and elevation in inflammatory markers. Started on empiric IV antibiotics. Course complicated by intermittent agitation on enhanced supervision and dysphagia. Palliative consulted to assist with advance directives and goc. 2 PC for CT angio performed 4/10 reveals B/L PE and GGO.     Acute hypoxic respiratory failure - On supplemental oxygen. Repeat COVID-19 noted to be positive. On hydroxychloroquine. Infectious Disease consultation noted.    Pulmonary emboli - On enoxaparin for anticoagulation.    Schizophrenia - On valproic acid.    Dysphagia - Speech pathology evaluation pending. On puree diet.

## 2020-04-12 NOTE — CHART NOTE - NSCHARTNOTEFT_GEN_A_CORE
Patient seen and examined at bedside for evaluation of placing patient in restraints. RN called to report patient pulling off nonrebreather, desatting, with poor pleth on monitor as pulse ox also being compromised d/t agitation. Earlier in the shift patient given Ativan with poor effect, also on constant observation however, continuing to pull off nonrebreather. Patient resting comfortably, alert however not oriented, nonverbal. Exhibiting restlessness/agitation when I put the mask back on patient.     Vital Signs Last 24 Hrs  T(C): 37.1 (12 Apr 2020 21:07), Max: 37.1 (12 Apr 2020 21:07)  T(F): 98.8 (12 Apr 2020 21:07), Max: 98.8 (12 Apr 2020 21:07)  HR: 110 (12 Apr 2020 21:07) (85 - 110)  BP: 124/63 (12 Apr 2020 21:07) (124/63 - 135/72)  BP(mean): --  RR: 23 (12 Apr 2020 21:07) (20 - 23)  SpO2: 91% (12 Apr 2020 21:07) (91% - 97%)    PHYSICAL EXAM:  GENERAL: NAD, lying in bed comfortably  HEAD:  Atraumatic, Normocephalic  EYES: EOMI, PERRL, conjunctiva and sclera clear  ENT: Moist mucous membranes  NECK: Supple, No JvD  CHEST/LUNG: Decreased breath sounds; No rales, rhonchi, wheezing, or rubs. Unlabored respirations  HEART: Regular rate and rhythm; No murmurs, rubs, or gallops  EXTREMITIES:  2+ Peripheral Pulses, brisk capillary refill. No clubbing, cyanosis, or edema    A/P:    Mr. Rodríguez is a 80 year old male with PMHx of HTN, thrombocytopenia, schizoaffective disorder, resident of [facility] presented with cough and fever x 3-4 days.  In ER, found to be hypoxic and placed on NRB with improvement. Found to be COVID+. Continues to be restless/agitated and removing NRB resulting in desaturation on monitor.     -Attempts made to calm patient with ativan and constant obs however still pulling off NRB  -Placed patient in unrestrained mittens  -Added additional O2 with 3L nasal cannula as patient still able to move NRB off mask with mits  -Patient is comfortable and in no acute distress, tolerating mittens well, sats improved to 91% with good pleth on monitor  -Vital signs reviewed, stable  -Continue to monitor

## 2020-04-12 NOTE — PROGRESS NOTE ADULT - SUBJECTIVE AND OBJECTIVE BOX
ULYSSES RANGEL  ----------------------------------------  The patient was seen and evaluated for respiratory failure. Offers no complaints. Somnolent but awakens with voice.    Vital Signs Last 24 Hrs  T(C): 36.7 (12 Apr 2020 08:46), Max: 37.1 (11 Apr 2020 15:05)  T(F): 98 (12 Apr 2020 08:46), Max: 98.7 (11 Apr 2020 15:05)  HR: 85 (12 Apr 2020 08:46) (82 - 95)  BP: 135/72 (12 Apr 2020 08:46) (114/65 - 135/72)  BP(mean): --  RR: 20 (12 Apr 2020 08:46) (20 - 25)  SpO2: 97% (12 Apr 2020 08:46) (95% - 100%)    PHYSICAL EXAMINATION:  ----------------------------------------  General appearance: No acute distress, Awake, Alert  HEENT: Normocephalic, Atraumatic, Conjunctiva clear, EOMI  Neck: Supple, No JVD, No tenderness  Lungs: Breath sound equal bilaterally, No wheezes, Mild rales  Cardiovascular: S1S2, Regular rhythm  Abdomen: Soft, Nontender, Nondistended, No guarding/rebound, Positive bowel sounds  Extremities: No clubbing, No cyanosis, No edema, No calf tenderness  Neuro: Strength equal bilaterally, No tremors  Psychiatric: Appropriate mood, Normal affect    MEDICATIONS  (STANDING):  enoxaparin Injectable 80 milliGRAM(s) SubCutaneous every 12 hours  haloperidol    Concentrate 5 milliGRAM(s) Oral two times a day  hydroxychloroquine   Oral   hydroxychloroquine 200 milliGRAM(s) Oral every 12 hours  valproic  acid Syrup 750 milliGRAM(s) Oral every 12 hours    MEDICATIONS  (PRN):  acetaminophen   Tablet .. 650 milliGRAM(s) Oral every 4 hours PRN Temp greater or equal to 38.5C (101.3F)  morphine  - Injectable 2 milliGRAM(s) IV Push every 6 hours PRN RR>22  saline laxative (FLEET) Rectal Enema 1 Enema Rectal at bedtime PRN constipation      ASSESSMENT / PLAN:  ----------------------------------------  Mr. Rangel is a 80 year old male with PMHx of HTN, thrombocytopenia, schizoaffective disorder, resident of [facility] presented with cough and fever x 3-4 days. ROS + dyspnea and sick contact as other residents tested +COVID. In ER, found to be hypoxic and placed on NRB with improvement. CXR with bilateral opacities concerning for PNA/COVID infection. COVID test x 2 neg. Labs remarkable to transaminitis and elevation in inflammatory markers. Started on empiric IV antibiotics. Course complicated by intermittent agitation on enhanced supervision and dysphagia. Palliative consulted to assist with advance directives and goc. 2 PC for CT angio performed 4/10 reveals B/L PE and GGO.     Acute hypoxic respiratory failure - On supplemental oxygen via non-rebreather mask. Repeat COVID-19 noted to be positive. On hydroxychloroquine. Infectious Disease consultation noted.    Pulmonary emboli - On enoxaparin for anticoagulation.    Schizophrenia - On valproic acid. Haloperidol restarted.    Dysphagia - On puree diet. Speech Pathology evaluation attempted but unable to be performed due to hypoxia with removal of the oxygen mask.    Palliative Care consultation noted. DNR status noted.

## 2020-04-12 NOTE — PROVIDER CONTACT NOTE (OTHER) - SITUATION
Pt shows possible Vfib/Vtach on cardiac monitor. Non communicative, pt easy to arouse and all other VSS.
Pt sustaining spo2 of 88%. Awaiting repeat COVID results

## 2020-04-13 NOTE — GOALS OF CARE CONVERSATION - ADVANCED CARE PLANNING - CONVERSATION DETAILS
Christel conferred with palliative physician earlier today Dr Quan regarding patients decompensation and need for DNI- comfort measures. CHRISTEL contacted Tia Fall in Ethics to complete request for ethics consult for comfort care measures and DNI. Ethic  and [employer] services had approved DNR only on 4/8.     CHRISTEL received call back form Ethics who report consenting for DNI and comfort care measures at this point. CHRISTEL sent  Mehul Waddell from [employer] new MOLST checklist and clinical documentation for review.    Christel received letter of no objection to DNI and comfort care measures for patient . Dr Quan notified and ordered placed. New MOLST with legal documents provided on chart.

## 2020-04-13 NOTE — PROGRESS NOTE ADULT - SUBJECTIVE AND OBJECTIVE BOX
ULYSSES JUAN CARLOS  ----------------------------------------  The patient was seen and evaluated for acute hypoxic respiratory failure. Disoriented. Offers no complaints.    Vital Signs Last 24 Hrs  T(C): 36.8 (13 Apr 2020 08:43), Max: 37.1 (12 Apr 2020 21:07)  T(F): 98.3 (13 Apr 2020 08:43), Max: 98.8 (12 Apr 2020 21:07)  HR: 76 (13 Apr 2020 08:43) (76 - 110)  BP: 87/51 (13 Apr 2020 08:43) (87/51 - 135/63)  BP(mean): --  RR: 20 (13 Apr 2020 08:43) (20 - 25)  SpO2: 94% (13 Apr 2020 05:24) (91% - 94%)    PHYSICAL EXAMINATION:  ----------------------------------------  General appearance: No acute distress, Awake, Alert  HEENT: Normocephalic, Atraumatic, Conjunctiva clear, EOMI  Neck: Supple, No JVD, No tenderness  Lungs: Breath sound equal bilaterally, No wheezes, Mild rales  Cardiovascular: S1S2, Regular rhythm  Abdomen: Soft, Nontender, Nondistended, No guarding/rebound, Positive bowel sounds  Extremities: No clubbing, No cyanosis, No edema, No calf tenderness  Neuro: Strength equal bilaterally, No tremors  Psychiatric: Appropriate mood, Normal affect    LABORATORY STUDIES:  ----------------------------------------             11.6   10.61 )-----------( 215      ( 12 Apr 2020 18:25 )             37.2     04-12    147<H>  |  108<H>  |  17.0  ----------------------------<  122<H>  4.3   |  29.0  |  0.48<L>    Ca    9.8      12 Apr 2020 18:25    MEDICATIONS  (STANDING):  enoxaparin Injectable 80 milliGRAM(s) SubCutaneous every 12 hours  haloperidol    Concentrate 5 milliGRAM(s) Oral two times a day  sodium chloride 0.9% Bolus 500 milliLiter(s) IV Bolus once  valproic  acid Syrup 750 milliGRAM(s) Oral every 12 hours    MEDICATIONS  (PRN):  acetaminophen   Tablet .. 650 milliGRAM(s) Oral every 4 hours PRN Temp greater or equal to 38.5C (101.3F)  LORazepam   Injectable 2 milliGRAM(s) IV Push every 4 hours PRN Agitation  morphine  - Injectable 2 milliGRAM(s) IV Push every 6 hours PRN RR>22  saline laxative (FLEET) Rectal Enema 1 Enema Rectal at bedtime PRN constipation      ASSESSMENT / PLAN:  ----------------------------------------  Mr. Rodríguez is a 80 year old male with PMHx of HTN, thrombocytopenia, schizoaffective disorder, resident of [facility] presented with cough and fever x 3-4 days. ROS + dyspnea and sick contact as other residents tested +COVID. In ER, found to be hypoxic and placed on NRB with improvement. CXR with bilateral opacities concerning for PNA/COVID infection. COVID test x 2 neg. Labs remarkable to transaminitis and elevation in inflammatory markers. Started on empiric IV antibiotics. Course complicated by intermittent agitation on enhanced supervision and dysphagia. Palliative consulted to assist with advance directives and goc. 2 PC for CT angio performed 4/10 reveals B/L PE and GGO.   He continued to have hypoxia requiring the use of a non-rebreather mask. He was also disoriented and continued to pull off the mask and haloperidol was restarted.    Acute hypoxic respiratory failure - On supplemental oxygen via non-rebreather mask. Repeat COVID-19 noted to be positive. Completed the course of hydroxychloroquine.    Pulmonary emboli - On enoxaparin for anticoagulation.    Schizophrenia - On valproic acid and haloperidol.    Dysphagia - On puree diet. Speech Pathology evaluation attempted but unable to be performed due to hypoxia with removal of the oxygen mask.    Hypotension and tachycardia - For intravenous fluid bolus.    Overall prognosis guarded/poor. Palliative Care consultation noted. DNR status noted.

## 2020-04-13 NOTE — PROGRESS NOTE ADULT - SUBJECTIVE AND OBJECTIVE BOX
FOLLOW UP VISIT    INTERVAL HPI/OVERNIGHT EVENTS:  CTA last week with bilateral PEs. Most recent repeat COVID test positive.  Now more hypotensive despite fluid challenge, tachycardic and tachypneic despite 100% on NRB + addition of O2 via NC and episode of vtach vs vfib overnight. More agitated on 1:1 supervision. Overall worsening clinical deterioration over past 24-48 hours.     Unable to perform ROS due to mentation and underlying psychiatric illness.     MEDICATIONS  (STANDING):  enoxaparin Injectable 80 milliGRAM(s) SubCutaneous every 12 hours  haloperidol    Concentrate 5 milliGRAM(s) Oral two times a day  sodium chloride 0.9% Bolus 500 milliLiter(s) IV Bolus once  valproic  acid Syrup 750 milliGRAM(s) Oral every 12 hours    MEDICATIONS  (PRN):  acetaminophen   Tablet .. 650 milliGRAM(s) Oral every 4 hours PRN Temp greater or equal to 38.5C (101.3F)  LORazepam   Injectable 2 milliGRAM(s) IV Push every 4 hours PRN Agitation  morphine  - Injectable 2 milliGRAM(s) IV Push every 6 hours PRN RR>22  saline laxative (FLEET) Rectal Enema 1 Enema Rectal at bedtime PRN constipation    Limited Physical Exam:  Gen: ill appearing, frail. no acute distress  HEENT: on NRB +NC  Lung: tachypneic, abd breathing noted  Cardio: s1/s2. RRR  GI: soft. NT. ND  MSK: no cyanosis  Psych: mild restlessness      Vital Signs Last 24 Hrs  T(C): 36.8 (13 Apr 2020 08:43), Max: 37.1 (12 Apr 2020 21:07)  T(F): 98.3 (13 Apr 2020 08:43), Max: 98.8 (12 Apr 2020 21:07)  HR: 76 (13 Apr 2020 08:43) (76 - 110)  BP: 91/57 (13 Apr 2020 10:37) (87/51 - 135/63)  BP(mean): --  RR: 20 (13 Apr 2020 08:43) (20 - 25)  SpO2: 94% (13 Apr 2020 05:24) (91% - 94%)     RADIOLOGY & ADDITIONAL STUDIES:     CTA 4/10/2020  FINDINGS:  LUNGS AND AIRWAYS: Patent central airways.  Diffuse patchy groundglass opacity throughout both lungs.  PLEURA: No pleural effusion.  MEDIASTINUM AND GLORIA: No lymphadenopathy.  VESSELS: There is bilateral pulmonary embolism within the lobar and segmental branches involving the upper and lower lobes. The thoracic aorta and main pulmonary artery are normal in caliber.  HEART: Heart size is normal. No pericardial effusion.  CHEST WALL AND LOWER NECK: The thyroid is within normal limits. There is no supraclavicular or axillary lymphadenopathy.  VISUALIZED UPPER ABDOMEN: The imaged portion of the liver, spleen and kidneys are within normal limits.  BONES: Degenerative change of the thoracic spine with osteophytes.    IMPRESSION:   Pattern of GGO suggests infection including atypical pneumonia/viral infection from atypical agents including COVID-19 (C19V-1).  Bilateral pulmonary embolism as detailed above.    ADVANCE DIRECTIVES: DNR only with approval from OMS SHELIA in chart

## 2020-04-13 NOTE — CONSULT NOTE ADULT - REASON FOR ADMISSION
Hypoxic respiratory failure, PUI for COVID 19; acute metabolic encephalopathy

## 2020-04-13 NOTE — CONSULT NOTE ADULT - CONSULT REASON
Advance Directives/GOC
Assist the team in ethical dilemma posed by a 80 year old unbefriended man with COVID-19 viral pneumonia, acute encephalopathy, hypoxia and bilateral pulmonary embolism, currently with guarded prognosis, now with worsening clinical deterioration.
Hypoxic respiratory failure
CONSULT PURPOSE:  Assist the team in ethical dilemma posed by a 80 year old unbefriended man with COVID-19 viral pneumonia, acute encephalopathy and hypoxia, currently with guarded prognosis, now with end of life decisions.

## 2020-04-13 NOTE — CHART NOTE - NSCHARTNOTEFT_GEN_A_CORE
Updated by Palliative SHELLY Swenson that after review of case by ethics and OMS  Mehulkris RangelBairon, approval received by Dara Bairon for DNI and transitioning to comfort measures. Pt is already approved for DNR with molst in chart currently. Will update new molst to reflect DNR/I with comfort measures and place in chart.     I updated and D/W NP Esther Sanchez. She informed me pt still very tachypneic with hemodynamic instablity and unable to take any meds by mouth.     Plan:   - Morphine infusion at 2 mg/hr with parameters + PRNs to maintain seamless symptom control   - IVP ativan 2 mg q8H ATC and keep same PRNs.   - D/C haloperidol PO as pt is unable to take oral meds  - Changed PO valproic acid to IV formulation

## 2020-04-13 NOTE — DIETITIAN INITIAL EVALUATION ADULT. - OTHER INFO
80 year old male with PMHx of HTN, thrombocytopenia, schizoaffective disorder, resident of [facility] presented with cough and fever x 3-4 days. ROS + dyspnea and sick contact as other residents tested +COVID. In ER, found to be hypoxic and placed on NRB with improvement. CXR with bilateral opacities concerning for PNA/COVID infection. COVID test x 2 neg. Labs remarkable to transaminitis and elevation in inflammatory markers. Started on empiric IV antibiotics. Course complicated by intermittent agitation on enhanced supervision and dysphagia. Palliative consulted to assist with advance directives and goc. 2 PC for CT angio performed 4/10 reveals B/L PE and GGO.  Pt now covid +. Pt with good po intake 4/11. High sodium levels noted. weight hx unknown.

## 2020-04-13 NOTE — CONSULT NOTE ADULT - SUBJECTIVE AND OBJECTIVE BOX
Consult requested by: Leela Joaquin  [facility]   Role: Social Work     Service: Palliative Care         Attending: LUPE Herring MD Other Consultants CAREY Gallo MD, Psychiatry; RITA Quan, Palliative Care  Consultant: Tia Fall MS, MD, [facility] Medical Ethicist/[employer] Fellow Contact # [phone] (c) [phone]  Consult purpose:  Assist the team in ethical dilemma posed by a 80 year old unbefriended man with COVID-19 viral pneumonia, acute encephalopathy, hypoxia and bilateral pulmonary embolism, currently with guarded prognosis, now with worsening clinical deterioration.  							  Clinical summary (SEE INITIAL CONSULTATION 4/8/20): This is 81 y/o with PMH of HTN, thrombocytopenia, schizoaffective disorder, hyponatremia; BIBEMS c/o cough and fever x 3-4 days, associated w/ SOB which has been progressively worsening upon arrival. Pt resides at [facility]. At baseline, patient is wheelchair bound and nonverbal and not represented by family. Do Not Resuscitate Order placed on 4/8/20. Patient has tested COVID positive. Patient now with bilateral Pulmonary Emboli on CT Angiogram on 4/10/13. Patient now more hypotensive despite fluid challenge, tachycardic and tachypneic despite 100% on NRB with addition of O2 via NC. Patient had episode of ventricular tachycardia vs ventral fibrillation overnight. He is more agitated and requires 1:1 supervision. Overall worsening clinical deterioration over past 24-48 hours. Overall prognosis is very guarded with high risk of further decompensation and mortality. Ethics consult now initiated to assist the team in proper procedure regarding Do Not Intubate Order as well as comfort measures in a patient with mental illness given the patient’s current state and his guarded prognosis.     Prognosis Estimate (survival in hrs, days, wks, mos, yrs): Prognosis is guarded.     Patient Decision-Making Capacity:  Has capacity    Lacks capacity due to current medical condition  Patient Aware of:  Diagnosis:   Yes    No   Unknown    Prognosis:   Yes    No   Unknown       Name of medical decision-maker should patient lack capacity: None  Other ‘stakeholders’: None   Other Decision-Maker (i.e., HCA or Surrogate) Aware of:  Diagnosis: Yes   No      Prognosis:   Yes     No   Evidence of Patient’s Preference of Life-Sustaining Treatment (Written or Oral): none  Resuscitation status:   DNR:  Yes as of 4/8/20   No      DNI:  Yes   No    AS A RESULT OF THIS CONSULT    Discussion with Leela Joaquin LCSW (Palliative Care) on 4/13/20: Reviewed patient’s record and worsening condition and need for further review for DNI and comfort measures.  Discussion with RITA Quan MD (Palliative Care) on 4/13/20: Patient has tested COVID+ and has bilateral pulmonary embolui . He is deteriorating. Currently hypotensive and tachypneic.   Ethics Committee Convened on 4/13/20 from [id number] via phone with Oren Gamboa MD (Ethicist), Leonel Garcia MD (Ethicist), Tia Fall MD (Ethicist), Chaplain Pratik Jon (Community Member) and Low Gutierrez MD ([facility] Medical Director) and Neelam Quan MD (Palliative Care): Patient’s case discussed in detail. Patient has now tested COVID19 positive and new bilateral PEs. Patient is hypotensive and tachypneic. His condition is worsening. Patient already has multiple co-morbidities. Committee all in agreement with Do Not Intubate with comfort measures as this patient meets criteria.    Bioethics analysis: THE CENTRAL ETHICAL ISSUES PRESENTED IN THIS CASE ARE A) RESPECTING PATIENT AUTONOMY AND B) PROVIDER BENEFICENCE/NON-MALEFICENCE DURING A PERIOD OF PANDEMIC WHERE SOCIAL JUSTICE CONCERNS FOR FAIR AND ETHICAL TREATMENT ARE TANTAMOUNT.   (See prior consult)     The ethical issue now surrounds whether intubation will benefit the patient. As the patient has no benefit of surrogate,   the 2010 [facility] Family Health Care Decisions Act (FHDCA)1 addresses the situation of a sick individual who lacks capacity and has no available surrogate may have a plan of care that includes orders regarding the provision or withdrawal/withholding of life-sustaining treatment, if two physicians or nurse practitioners and an Ethics Review Committee agree that the patient meets certain criteria. WHERE THE PATIENT CONTINUES TO BE INCAPACITATED THE ATTENDING PHYSICIAN MAY AUTHORIZE MAJOR MEDICAL TREATMENT AFTER CONSULTATION AND CONCURRENCE WITH THE PATIENT’S HEALTH CARE TEAM AND AN INDEPENDENT PHYSICIAN NOT INVOLVED WITH THE PATIENT’S CARE CONCURS WITH THE TREATMENT.  In this case, the patient has a guarded prognosis and is currently incapacitated from making medical decisions.  There is no known family or other surrogate decision makers at this time.      As in the prior consultation, intubation now will not offer the patient medical benefit because the patient will die imminently, even if the treatment is provided, and the provision of the treatment would violate accepted medical standards. Yet the provision of escalating treatments that would be reasonably deemed inhumane or cause extraordinary burden violates acceptable standards of medical care.  [facility] Law imposes some constraints on patients’ and clinicians’ decisions about forgoing life sustaining treatments (LSTs).  When a patient is seriously ill and survival is in doubt, the [facility] authorizes the decision makers to forgo life-sustaining treatment, in accordance with the patient’s best interests. Under the [facility], a decision to withhold LSTs must meet two criteria:  • Criterion 1: Treatment would be an extraordinary burden to the patient        			AND: (Select a or b)  a.	The patient has an illness or injury which can be expected to cause death within six months,   whether or not treatment is provided; OR         		b.    The patient is permanently unconscious.  • Criterion 2:  The patient has an irreversible or incurable condition; AND the provision of treatment   would involve such pain, suffering or other burden that it would reasonably be deemed inhumane or   extraordinarily burdensome under the circumstances.    In this case, the patient’s condition with COVID 19 is now terminal as he has worsening multiorgan system failure and currently deteriorating. He is now tachypneic and hypotensive. He has COVID bilateral pneumonia with bilateral pulmonary emboli. At present ,  he does meet criteria to withhold intubation and ventilator support because he is expected to die within six months as the high mortality mSOFA predicts     Use of the Checklist #4 (https://www.health.[place].gov/professionals/patients/patient_rights/molst/docs/checklist_4.pdf) it  ensures that the appropriate statutory standards have been met prior to use of the MOLST process. The advantage of the MOLST form is that it is transferable to other settings across care transitions.

## 2020-04-13 NOTE — CONSULT NOTE ADULT - ASSESSMENT
Recommendation:     Consistent with the ethical principle of beneficence, the clinical team must critically assess the efficacy of the therapies on a case by case basis to ascertain if a procedure will cause more harm than benefit the clinical team has a duty to appropriately offer measures that promote comfort.   The team is commended for being great stewards of scarce resources during this time of pandemic COVID 19. All patients, such as Mr. Rodríguez should have their advanced directives addressed. As a result of this consult a DNI should be obtained as well as comfort measures.   Thank you for this challenging case.      Ethics Service will remain available for further conversation about the patient’s current condition and decision-points that may occur.       CASE DISCUSSED with Dr. Leonel Duncan Chair of Ethics     More than 50% of the time of this consultation was spent in coordination of Care of Patient.	    REFERENCES     Gt RIVERA, Shell BOSTON. A Framework for Rationing Ventilators and Critical Care Beds During the COVID-19 Pandemic. DANIEL. Published online March 27, 2020. doi:10.1001/daniel.2020.5046. <https://jamanetwork.com/journals/daniel/fullarticle/3516999>    Fatuma MARQUEZ. The Family Health Care Decisions Act.  [facility] Health Law Journal,2010;15:32-35.

## 2020-04-13 NOTE — PROGRESS NOTE ADULT - ASSESSMENT
80M resident of [facility] with schizoaffective disorder, thrombocytopenia, HTN admitted for dyspnea, cough and fever, found with bilateral pna on CXR, concerning for covid exposure given +sick contact at facility with covid. Initial COVID testing were negative however still with high clinical suspicion. Course complicated by transaminitis, dysphagia, bilateral PE on CTA and most recent repeat COVID test positive. Now with worsening hemodynamics and clinical deterioration, hypotensive, tachycardic, tachypnea despite 100% NRB + O2NC and possible vtach vs vfib overnight.      PLAN    Acute Respiratory Failure/Dyspnea  - worsening hemodynamics and respiratory decompensation  - on maximal NRB support and O2NC  - tachypneic and notable abdominal breathing on exam   - high risk of further respiratory decompensation with potential need for intubation which unfortunately given comorbidities concurrent with PEs, +COVID, not likely to be medically therapeutic   - limitation to use of morphine for dyspnea due to hypotension and goals of continued medical interventions    Acute Pulmonary Embolism  - likely secondary to covid infection causing higher incidence of VTE  - on therapeutic lovenox today    Pneumonia, Bacterial vs Viral  Positive COVID  - CXR with bilateral infiltrates  - s/p azithromycin and plaquenil  - continues to clinically worsen, overall very poor prognosis    Schizoaffective/Bipolar Disorder  - on valproic acid and haloperidol  - worsening agitation now on 1:1 supervision  - appreciate psych follow up    Dysphagia  - on pureed, thin   - high risk of aspiration precaution    Palliative Care Encounter  Pt continues to have worsening clinical deterioration and respiratory decompensation (on higher O2 requirement NRB + NC) in setting of preexisting comorbidities concurrent with PNA 2/2 positive COVID infection, bilateral PE, poor oral intake, worsening agitation on 1:1 observation, hypotension/tachypcardia, episodes of vtach vs vfib. Overall prognosis is very guarded with high risk of further decompensation and mortality. Pt is already a DNR. Review of advance directives for do not intubate and comfort measures would be appropriate as intubation would not be medically therapeutic and would impose even greater pain and symptom burden on this unfortunate gentleman. Pending review by ethics committee for further guidance.

## 2020-04-13 NOTE — DIETITIAN INITIAL EVALUATION ADULT. - PERTINENT LABORATORY DATA
04-12 Na147 mmol/L<H> Glu 122 mg/dL<H> K+ 4.3 mmol/L Cr  0.48 mg/dL<L> BUN 17.0 mg/dL Phos n/a   Alb n/a   PAB n/a

## 2020-04-13 NOTE — CHART NOTE - NSCHARTNOTEFT_GEN_A_CORE
Called by RN to report patient with arrhythmia on monitor. As per RN appeared to be in Vfib/Vtach/Artifact on monitor. Patient seen and examined at bedside, in NAD, resting comfortably. All vital signs stable, HR 95 palpated, /63, RR24, satting 92% on NRB. EKG ordered and reviewed. Patient in normal sinus rhythm. Multiple attempts at obtaining clear EKG attempted as patient is very restless, likely causing artifact on monitor. Continue to monitor. Of note, patient also DNR.

## 2020-04-14 NOTE — PROGRESS NOTE ADULT - REASON FOR ADMISSION
Hypoxic respiratory failure, PUI for COVID 19; acute metabolic encephalopathy

## 2020-04-14 NOTE — PROGRESS NOTE ADULT - SUBJECTIVE AND OBJECTIVE BOX
FOLLOW UP VISIT    INTERVAL HPI/OVERNIGHT EVENTS:  Transitioned to comfort measures yesterday. Pt comfortable and symptoms controlled.    Unable to perform ROS due to mentation.    MEDICATIONS  (STANDING):  LORazepam   Injectable 2 milliGRAM(s) IV Push every 8 hours  morphine  Infusion 2 mG/Hr (2 mL/Hr) IV Continuous <Continuous>  valproate sodium IVPB 750 milliGRAM(s) IV Intermittent every 12 hours    MEDICATIONS  (PRN):  glycopyrrolate Injectable 0.2 milliGRAM(s) IV Push every 4 hours PRN oral secretions  LORazepam   Injectable 2 milliGRAM(s) IV Push every 4 hours PRN Agitation  morphine  - Injectable 2 milliGRAM(s) IV Push every 3 hours PRN pain, dsynea, tachypnea RR>16    Physical Exam:  Due to the nature of this patient's COVID-19 isolation status, no bedside physical exam was done to limit spread of infection. Examination highlights were provided by bedside nurse and/or primary team. Objective data were reviewed in detail.    Vital Signs Last 24 Hrs  T(C): 37.5 (13 Apr 2020 15:35), Max: 37.5 (13 Apr 2020 15:35)  T(F): 99.5 (13 Apr 2020 15:35), Max: 99.5 (13 Apr 2020 15:35)  HR: 105 (13 Apr 2020 15:35) (99 - 105)  BP: 100/61 (13 Apr 2020 15:35) (100/61 - 123/64)  BP(mean): --  RR: 22 (13 Apr 2020 15:35) (22 - 22)  SpO2: 90% (13 Apr 2020 15:35) (90% - 90%)    LABS: reviewed  RADIOLOGY & ADDITIONAL STUDIES: reviewed    ADVANCE DIRECTIVES: DNR/I with comfort measures, [facility]

## 2020-04-14 NOTE — PROGRESS NOTE ADULT - ASSESSMENT
80M resident of [facility] with schizoaffective disorder, thrombocytopenia, HTN admitted for dyspnea, cough and fever, found with bilateral pna on CXR, concerning for covid exposure given +sick contact at facility with covid. Initial COVID testing were negative however still with high clinical suspicion. Course complicated by transaminitis, dysphagia, bilateral PE on CTA and most recent repeat COVID test positive. Yesterday, worsening clinical deterioration and respiratory decompensation. Case reviewed by ethics and [facility] , approval given for DNR/DNI and transition to full comfort measures.     PLAN    Acute Respiratory Failure/Dyspnea  - comfortable, c/w morphine infusion +PRNs    Acute Pulmonary Embolism  - likely secondary to covid infection causing higher incidence of VTE  - lovenox discontinued    Pneumonia, Bacterial vs Viral  Positive COVID  - CXR with bilateral infiltrates  - s/p azithromycin and plaquenil    Schizoaffective/Bipolar Disorder  - c/w IV valproic acid     Agitation  - c/w IV ativan PRNs    Palliative Care Encounter  Pt is on comfort measures. Symptoms controlled with current pharmacologic regimen. Will continue to follow and optimize sx at end of life.

## 2020-04-14 NOTE — PROGRESS NOTE ADULT - ATTENDING COMMENTS
D/W NP Esther Sanchez
D/W NP Esther Sanchez
D/W NP Esther Sanchez, Dr. Tia Fall, Palliative [facility]
seen and examined , agree with above   CTA obtained , showed BL PE , no hypotension , no tachycardia   only hypoxic , still suspicious for covid , swabed 4th time.  will start lovenox , continue plaquenil and steroids   ethics and palliative on board , is considered DNR but will be a candidate for intubation if needed .  carries guarded prognosis , remains on NRM . has a sitter at bedside for safety as patient keeps taking mask off .

## 2020-04-14 NOTE — PROGRESS NOTE ADULT - SUBJECTIVE AND OBJECTIVE BOX
ULYSSES RANGEL  ----------------------------------------  The patient was seen and evaluated for hypoxic respiratory failure. Lethargic, appears in no acute distress.    Vital Signs Last 24 Hrs  T(C): 37.5 (13 Apr 2020 15:35), Max: 37.5 (13 Apr 2020 15:35)  T(F): 99.5 (13 Apr 2020 15:35), Max: 99.5 (13 Apr 2020 15:35)  HR: 105 (13 Apr 2020 15:35) (76 - 105)  BP: 100/61 (13 Apr 2020 15:35) (87/51 - 123/64)  BP(mean): --  RR: 22 (13 Apr 2020 15:35) (20 - 22)  SpO2: 90% (13 Apr 2020 15:35) (90% - 90%)    PHYSICAL EXAMINATION:  ----------------------------------------  General appearance: No acute distress, Lethargic, Unresponsive  HEENT: Normocephalic, Atraumatic, Conjunctiva clear  Neck: Supple, No JVD  Lungs: Clear to auscultation, Breath sound equal bilaterally, Unlabored breathing  Cardiovascular: S1S2, Regular rhythm  Abdomen: Soft, Nontender, Positive bowel sounds  Extremities: No clubbing, No cyanosis, No edema    LABORATORY STUDIES:  ----------------------------------------             11.6   10.61 )-----------( 215      ( 12 Apr 2020 18:25 )             37.2     04-12    147<H>  |  108<H>  |  17.0  ----------------------------<  122<H>  4.3   |  29.0  |  0.48<L>    Ca    9.8      12 Apr 2020 18:25    MEDICATIONS  (STANDING):  LORazepam   Injectable 2 milliGRAM(s) IV Push every 8 hours  morphine  Infusion 2 mG/Hr (2 mL/Hr) IV Continuous <Continuous>  valproate sodium IVPB 750 milliGRAM(s) IV Intermittent every 12 hours    MEDICATIONS  (PRN):  glycopyrrolate Injectable 0.2 milliGRAM(s) IV Push every 4 hours PRN oral secretions  LORazepam   Injectable 2 milliGRAM(s) IV Push every 4 hours PRN Agitation  morphine  - Injectable 2 milliGRAM(s) IV Push every 3 hours PRN pain, dsynea, tachypnea RR>16      ASSESSMENT / PLAN:  ----------------------------------------  Mr. Rangel is a 80 year old male with PMHx of HTN, thrombocytopenia, schizoaffective disorder, resident of [facility] presented with cough and fever x 3-4 days. ROS + dyspnea and sick contact as other residents tested +COVID. In ER, found to be hypoxic and placed on NRB with improvement. CXR with bilateral opacities concerning for PNA/COVID infection. COVID test x 2 neg. Labs remarkable to transaminitis and elevation in inflammatory markers. Started on empiric IV antibiotics. Course complicated by intermittent agitation on enhanced supervision and dysphagia. Palliative consulted to assist with advance directives and goc. 2 PC for CT angio performed 4/10 reveals B/L PE and GGO.   He continued to have hypoxia requiring the use of a non-rebreather mask. He was also disoriented and continued to pull off the mask and haloperidol was restarted.    Acute hypoxic respiratory failure - On supplemental oxygen via non-rebreather mask. Repeat COVID-19 ultimately noted to be positive. Previously completed the course of hydroxychloroquine.    Pulmonary emboli - Was previously on enoxaparin for anticoagulation. Now discontinued.    Schizophrenia - On valproic acid.    Dysphagia - On puree diet. Speech Pathology was unable to evaluated the patient due to persistent hypoxia when the non-rebreather mask was removed.    Hypotension and tachycardia - Status post intravenous fluids. Blood pressure improved, still mildly tachycardic.    Overall prognosis guarded/poor. Palliative Care and ethics notes reviewed. The patient is now DNR/DNI and on a morphine infusion for comfort.

## 2020-04-15 NOTE — DISCHARGE NOTE FOR THE EXPIRED PATIENT - HOSPITAL COURSE
80M resident of [facility] with schizoaffective disorder, thrombocytopenia, HTN admitted for dyspnea, cough and fever, found with bilateral pna on CXR, concerning for covid exposure given +sick contact at facility with covid. Initial COVID testing were negative however still with high clinical suspicion. Course complicated by transaminitis, dysphagia, bilateral PE on CTA and most recent repeat COVID test positive.  Worsening clinical deterioration and respiratory decompensation. Case reviewed by ethics and OMS , approval given for DNR/DNI and transition to full comfort measures. Called by RN this am for noted unresponsiveness. Patient seen and examined. Absent breathe sounds and apical pulse. Pupils fixed, negative corneal reflex. Pronounced at 0745. Next of kin notified.

## 2020-08-04 NOTE — ED PROVIDER NOTE - MUSCULOSKELETAL, MLM
Recommend continued MONITORING for glaucoma. Spine appears normal, range of motion is not limited, no muscle or joint tenderness

## 2022-07-06 NOTE — ED PROVIDER NOTE - SCRIBE NAME
Theodora Cassidy Opioid Counseling: I discussed with the patient the potential side effects of opioids including but not limited to addiction, altered mental status, and depression. I stressed avoiding alcohol, benzodiazepines, muscle relaxants and sleep aids unless specifically okayed by a physician. The patient verbalized understanding of the proper use and possible adverse effects of opioids. All of the patient's questions and concerns were addressed. They were instructed to flush the remaining pills down the toilet if they did not need them for pain.

## 2023-03-17 NOTE — CONSULT NOTE ADULT - SUBJECTIVE AND OBJECTIVE BOX
Clinical summary: This is 81 y/o with PMH of HTN, thrombocytopenia, schizoaffective disorder, hyponatremia; BIBEMS c/o cough and fever x 3-4 days, associated w/ SOB which has been progressively worsening upon arrival. Pt resides at [facility], and had a SpO2 of 60%. Patient placed on NRB and is currently has a O2 sat of 92%. Positive sick contact at [place] hospitalized with COVID 19.ED HPI reviewed, on my evaluation patient is lethargic, non communicative, opens his eyes to verbal stimuli. At present, ICU experiencing surge with COVID 19 patients. Patient has tested SARS 2 PCR negative but has bilateral pneumonia, severe hypoxic and toxic encephalopathy. At baseline, patient is wheelchair bound and nonverbal and not represented by family. Ethics consult was initiated to assist the team in proper procedure regarding advanced directives in a patient with mental illness given the patient’s current state and his guarded prognosis.     Prognosis Estimate (survival in hrs, days, wks, mos, yrs): Prognosis is guarded.     Patient Decision-Making Capacity:  Has capacity    Lacks capacity due to current medical condition  Patient Aware of:  Diagnosis:   Yes    No   Unknown    Prognosis:   Yes    No   Unknown       Name of medical decision-maker should patient lack capacity: None  Other ‘stakeholders’: None   Other Decision-Maker (i.e., HCA or Surrogate) Aware of:  Diagnosis: Yes   No      Prognosis:   Yes     No   Evidence of Patient’s Preference of Life-Sustaining Treatment (Written or Oral): none  Resuscitation status:   DNR:  Yes   No      DNI:  Yes   No    AS A RESULT OF THIS CONSULT    Discussion with Leela Joaquin LCSW (Palliative Care) on 4/3/20: Reviewed patient’s record and guarded prognosis. The patient is a resident of [facility] and he does not have a surrogate or legal guardian. Patient has a cousin who refuses not be involved.  Ethics Committee Convened on 4/8/20 from [id number] via phone with Oren Gamboa MD (Ethicist), Leonel Garcia MD (Ethicist), Tia Fall MD (Ethicist), Chaplain Pratik Jon (Community Member) and Low Gutierrez MD ([facility] Medical Director): Patient’s case discussed in detail. Patient has tested COVID19 negative but with direct contact of COVID19 patient and with multiple co-morbidities. Committee all in agreement with Do Not Resuscitate as this patient meets criteria that given advanced illness with longstanding hypertension in the face of SIRS would not survive cardiopulmonary resuscitation. The benefit/harm for this patient in regards to intubation was also discussed.     Bioethics analysis: THE CENTRAL ETHICAL ISSUES PRESENTED IN THIS CASE ARE A) RESPECTING PATIENT AUTONOMY AND B) PROVIDER BENEFICENCE/NON-MALEFICENCE DURING A PERIOD OF PANDEMIC WHERE SOCIAL JUSTICE CONCERNS FOR FAIR AND ETHICAL TREATMENT ARE TANTAMOUNT.     The conflict that exists in this situation is one hospital clinicians infrequently encounter in acutely critically ill patients who have poor prognosis for recovery but are without family members or surrogates to share decisions regarding aggressive life-sustaining treatments (LST’s) versus appropriate hospice placement. Mr. Rodríguez is mentally ill and does not have a surrogate nor legal guardian.    At one pole, is the bioethical principle of autonomy- here needing to be determined. In tension at the other pole is the physician’s duty of non-maleficence – with interventions that appear to neither have little long-term benefit nor restore a functional quality of life for a specific patient and may increase the patient’s suffering or prolong the dying process.  Clinicians may variably interpret the principle of beneficence ["do good"] in this kind of situation, depending on their own values about the commitment to sustain life. Medical decision making for patients with neither decision-making capacity (DMC) nor a surrogate decision-maker presents an ethical challenge for healthcare providers because there is no way to obtain informed consent for treatment.  This is particularly so when these decisions involve invasive/life-threatening procedures or withholding or withdrawing life-sustaining treatments and providing appropriate palliative care.  In this case, the health team is commended for their virtue and invoking justice – by providing fairness and equitable care to while attempting to locate a surrogate. Here, there is no surrogate to assist the medical team using the shared decision-making model to determine the level of care going forward.  The bioethical principle of beneficence calls on clinicians to respect patient autonomy and to honor and preserve Mr. Rodríguez’s sense of dignity and personhood, his moral status. Clinicians may variably argue that beneficence calls for further aggressive interventions. Beneficence aims to promote the best possible health or quality of living or dying: with aggressive interventions when these help prolong life with "good quality", with comfort care when LST’s are not possible and the aim is to achieve the best "quality of dying." The clinician’s duty to non-maleficence means avoiding medical interventions whose risk and burden exceeds their benefit ("first do no harm"). As this patient lacks capacity, it is appropriate for protection of his autonomy.  The 2010 [facility] Family Health Care Decisions Act (FHDCA)1 addresses the situation of a sick individual who lacks capacity and has no available surrogate may be enrolled in hospice with a plan of care that includes orders regarding the provision or withdrawal/withholding of life-sustaining treatment, if two physicians or nurse practitioners and an Ethics Review Committee agree that the patient meets certain criteria. The DCA requires hospitals, after a patient is admitted, to determine if the patient has a health care agent, guardian, or a person who can serve as the patient’s surrogate.  If the patient has no such person and lacks capacity, the hospital must identify, to the extent practical, the patient’s wishes and preferences about pending health care decisions.  WHERE THE PATIENT CONTINUES TO BE INCAPACITATED THE ATTENDING PHYSICIAN MAY AUTHORIZE MAJOR MEDICAL TREATMENT AFTER CONSULTATION AND CONCURRENCE WITH THE PATIENT’S HEALTH CARE TEAM AND AN INDEPENDENT PHYSICIAN NOT INVOLVED WITH THE PATIENT’S CARE CONCURS WITH THE TREATMENT.  In this case, the patient has a guarded prognosis and is currently incapacitated from making medical decisions.  There is no known family or other surrogate decision makers at this time.    A decision to withdraw or withhold life-sustaining treatment can only be made after determination that the treatment offers the patient no medical benefit because the patient will die imminently, even if the treatment is provided, and the provision of the treatment would violate accepted medical standards. In this case, patient remains intubated with all life sustaining treatments. Yet the provision of escalating treatments that would be reasonably deemed inhumane or cause extraordinary burden violates acceptable standards of medical care.  [facility] Law imposes some constraints on patients’ and clinicians’ decisions about forgoing life sustaining treatments (LSTs).  When a patient is seriously ill and survival is in doubt, the [facility] authorizes the decision makers to forgo life-sustaining treatment, in accordance with the patient’s best interests. Under the DCA, a decision to withhold LSTs must meet two criteria:  • Criterion 1: Treatment would be an extraordinary burden to the patient        			AND: (Select a or b)  a.	The patient has an illness or injury which can be expected to cause death within six months,   whether or not treatment is provided; OR         		b.    The patient is permanently unconscious.  • Criterion 2:  The patient has an irreversible or incurable condition; AND the provision of treatment   would involve such pain, suffering or other burden that it would reasonably be deemed inhumane or   extraordinarily burdensome under the circumstances.    In this case, the patient is hypoxemic and responsive to oxygenation and has no tachypnea or respiratory acidosis. Therefore, there is no pressing need for intubation in this patient at present time. To ensure bias and nondiscrimination on this gentleman who has mental illness, is without advocacy of family and disabled objective criteria must be used to withhold intubation. At present during this pandemic, he does not meet mSOFA criteria to withhold ventilator support.     Use of the Checklist #4 (https://www.health.[place].gov/professionals/patients/patient_rights/molst/docs/checklist_4.pdf) it  ensures that the appropriate statutory standards have been met prior to use of the MOLST process. The advantage of the MOLST form is that it is transferable to other settings across care transitions. Billing Type: Third-Party Bill

## 2023-04-26 NOTE — ED PROVIDER NOTE - OTHER FINDINGS
nsr @ 77 with no acute st changes

## 2024-02-11 NOTE — ED ADULT TRIAGE NOTE - SPO2 (%)
A 44 year old male, from home, with PMHx of HTN and alcohol abuse, presented to the ED complaining of non bloody emesis that started last night around 10 pm. Denies abdominal pain or diarrhea. Patient has been drinking 12 beers and one bottle of Vodka daily for over the last 2 weeks after a separation from his wife. Last drink around 9 pm on 2/10/24. Denies suicide thoughts, intent, or plan. Patient was having tremors, headache, and vomiting and arrival that have improved. He does not have any other complaints. 
98

## 2025-04-03 NOTE — ED ADULT TRIAGE NOTE - NS ED NOTE AC HIGH RISK COUNTRIES
No Received referral for Hospice services. Approval received for RLOC at home.  Phone call to son who is the POA and daughter. Discussed hospice services but will have  visit with the family at the bedside to answer further questions. Will continue to follow.